# Patient Record
Sex: FEMALE | Race: BLACK OR AFRICAN AMERICAN | NOT HISPANIC OR LATINO | Employment: FULL TIME | URBAN - METROPOLITAN AREA
[De-identification: names, ages, dates, MRNs, and addresses within clinical notes are randomized per-mention and may not be internally consistent; named-entity substitution may affect disease eponyms.]

---

## 2023-04-06 ENCOUNTER — HOSPITAL ENCOUNTER (OUTPATIENT)
Dept: DATA CONVERSION | Facility: HOSPITAL | Age: 58
End: 2023-04-06
Attending: INTERNAL MEDICINE | Admitting: INTERNAL MEDICINE
Payer: COMMERCIAL

## 2023-04-06 DIAGNOSIS — K52.89 OTHER SPECIFIED NONINFECTIVE GASTROENTERITIS AND COLITIS: ICD-10-CM

## 2023-04-06 DIAGNOSIS — F17.290 NICOTINE DEPENDENCE, OTHER TOBACCO PRODUCT, UNCOMPLICATED: ICD-10-CM

## 2023-04-06 DIAGNOSIS — K64.8 OTHER HEMORRHOIDS: ICD-10-CM

## 2023-04-06 DIAGNOSIS — K92.1 MELENA: ICD-10-CM

## 2023-04-06 DIAGNOSIS — F41.9 ANXIETY DISORDER, UNSPECIFIED: ICD-10-CM

## 2023-04-06 DIAGNOSIS — K62.5 HEMORRHAGE OF ANUS AND RECTUM: ICD-10-CM

## 2023-04-17 LAB
COMPLETE PATHOLOGY REPORT: NORMAL
CONVERTED CLINICAL DIAGNOSIS-HISTORY: NORMAL
CONVERTED FINAL DIAGNOSIS: NORMAL
CONVERTED FINAL REPORT PDF LINK TO COPY AND PASTE: NORMAL
CONVERTED GROSS DESCRIPTION: NORMAL

## 2023-11-02 PROBLEM — R91.1 PULMONARY NODULE: Status: ACTIVE | Noted: 2023-11-02

## 2023-11-02 PROBLEM — H04.123 DRY EYE SYNDROME OF BOTH LACRIMAL GLANDS: Status: ACTIVE | Noted: 2023-11-02

## 2023-11-02 PROBLEM — G56.01 CARPAL TUNNEL SYNDROME OF RIGHT WRIST: Status: ACTIVE | Noted: 2023-11-02

## 2023-11-02 PROBLEM — N92.0 MENORRHAGIA: Status: ACTIVE | Noted: 2023-11-02

## 2023-11-02 PROBLEM — R93.5 ABNORMAL CT OF THE ABDOMEN: Status: ACTIVE | Noted: 2023-11-02

## 2023-11-02 PROBLEM — K62.5 RECTAL BLEEDING: Status: ACTIVE | Noted: 2023-11-02

## 2023-11-02 PROBLEM — N64.4 BREAST PAIN: Status: ACTIVE | Noted: 2023-11-02

## 2023-11-02 PROBLEM — J34.3 HYPERTROPHY OF BOTH INFERIOR NASAL TURBINATES: Status: ACTIVE | Noted: 2023-11-02

## 2023-11-02 PROBLEM — R10.11 CHRONIC RUQ PAIN: Status: ACTIVE | Noted: 2023-11-02

## 2023-11-02 PROBLEM — D49.9 NEOPLASM: Status: ACTIVE | Noted: 2020-01-20

## 2023-11-02 PROBLEM — R42 LIGHTHEADED: Status: ACTIVE | Noted: 2023-11-02

## 2023-11-02 PROBLEM — J30.2 ALLERGIC RHINITIS, SEASONAL: Status: ACTIVE | Noted: 2023-11-02

## 2023-11-02 PROBLEM — N83.209 OVARIAN CYST: Status: ACTIVE | Noted: 2023-11-02

## 2023-11-02 PROBLEM — H02.88A MEIBOMIAN GLAND DYSFUNCTION (MGD) OF UPPER AND LOWER EYELID OF RIGHT EYE: Status: ACTIVE | Noted: 2023-11-02

## 2023-11-02 PROBLEM — L98.9 SKIN LESION: Status: ACTIVE | Noted: 2023-11-02

## 2023-11-02 PROBLEM — G56.02 CARPAL TUNNEL SYNDROME OF LEFT WRIST: Status: ACTIVE | Noted: 2023-11-02

## 2023-11-02 PROBLEM — L30.9 DERMATITIS, UNSPECIFIED: Status: ACTIVE | Noted: 2020-01-20

## 2023-11-02 PROBLEM — H52.4 BILATERAL PRESBYOPIA: Status: ACTIVE | Noted: 2023-11-02

## 2023-11-02 PROBLEM — D25.9 UTERINE FIBROID: Status: ACTIVE | Noted: 2023-11-02

## 2023-11-02 PROBLEM — S12.9XXA CERVICAL SPINE FRACTURE (MULTI): Status: ACTIVE | Noted: 2023-11-02

## 2023-11-02 PROBLEM — N63.22 BREAST LUMP ON LEFT SIDE AT 11 O'CLOCK POSITION: Status: ACTIVE | Noted: 2023-11-02

## 2023-11-02 PROBLEM — R53.83 FATIGUE: Status: ACTIVE | Noted: 2023-11-02

## 2023-11-02 PROBLEM — L82.1 SEBORRHEIC KERATOSIS: Status: ACTIVE | Noted: 2020-01-20

## 2023-11-02 PROBLEM — E55.9 VITAMIN D DEFICIENCY: Status: ACTIVE | Noted: 2023-11-02

## 2023-11-02 PROBLEM — G89.29 CHRONIC RUQ PAIN: Status: ACTIVE | Noted: 2023-11-02

## 2023-11-02 PROBLEM — E87.6 HYPOKALEMIA: Status: ACTIVE | Noted: 2023-11-02

## 2023-11-02 PROBLEM — R26.89 IMBALANCE: Status: ACTIVE | Noted: 2023-11-02

## 2023-11-02 PROBLEM — L82.1 OTHER SEBORRHEIC KERATOSIS: Status: ACTIVE | Noted: 2020-01-20

## 2023-11-02 PROBLEM — N93.9 ABNORMAL UTERINE BLEEDING (AUB): Status: ACTIVE | Noted: 2023-11-02

## 2023-11-02 PROBLEM — H02.88B MEIBOMIAN GLAND DYSFUNCTION (MGD) OF UPPER AND LOWER EYELID OF LEFT EYE: Status: ACTIVE | Noted: 2023-11-02

## 2023-11-02 PROBLEM — R19.00 PELVIC MASS IN FEMALE: Status: ACTIVE | Noted: 2023-11-02

## 2023-11-02 PROBLEM — N64.4 BREAST TENDERNESS: Status: ACTIVE | Noted: 2023-11-02

## 2023-11-02 PROBLEM — N85.2 ENLARGED UTERUS: Status: ACTIVE | Noted: 2023-11-02

## 2023-11-02 PROBLEM — R51.9 FREQUENT HEADACHES: Status: ACTIVE | Noted: 2023-11-02

## 2023-11-02 PROBLEM — R92.8 ABNORMAL FINDINGS ON DIAGNOSTIC IMAGING OF BREAST: Status: ACTIVE | Noted: 2023-11-02

## 2023-11-02 PROBLEM — E04.1 THYROID NODULE: Status: ACTIVE | Noted: 2023-11-02

## 2023-11-02 RX ORDER — DOCUSATE SODIUM 100 MG/1
1 CAPSULE, LIQUID FILLED ORAL DAILY
COMMUNITY
Start: 2020-12-17 | End: 2023-11-03 | Stop reason: ALTCHOICE

## 2023-11-02 RX ORDER — FAMOTIDINE 40 MG/1
1 TABLET, FILM COATED ORAL DAILY
COMMUNITY
Start: 2023-02-15 | End: 2023-11-03 | Stop reason: ALTCHOICE

## 2023-11-02 RX ORDER — CLOBETASOL PROPIONATE 0.5 MG/G
OINTMENT TOPICAL
COMMUNITY
Start: 2023-03-20 | End: 2023-11-03 | Stop reason: ALTCHOICE

## 2023-11-02 RX ORDER — ARTIFICIAL TEARS 1; 2; 3 MG/ML; MG/ML; MG/ML
1-2 SOLUTION/ DROPS OPHTHALMIC AS NEEDED
COMMUNITY
Start: 2023-02-17 | End: 2023-11-03 | Stop reason: ALTCHOICE

## 2023-11-02 RX ORDER — FLUOCINONIDE 0.5 MG/G
1 CREAM TOPICAL
COMMUNITY
Start: 2020-01-20 | End: 2023-11-03 | Stop reason: ALTCHOICE

## 2023-11-02 RX ORDER — ACETAMINOPHEN 500 MG
1 TABLET ORAL DAILY
COMMUNITY
Start: 2018-02-09 | End: 2024-01-31 | Stop reason: WASHOUT

## 2023-11-02 RX ORDER — HYDROXYZINE HYDROCHLORIDE 25 MG/1
1 TABLET, FILM COATED ORAL EVERY 6 HOURS PRN
COMMUNITY
Start: 2023-02-19 | End: 2023-11-03 | Stop reason: ALTCHOICE

## 2023-11-02 RX ORDER — ALBUTEROL SULFATE 90 UG/1
2 AEROSOL, METERED RESPIRATORY (INHALATION) 4 TIMES DAILY PRN
COMMUNITY
Start: 2018-07-08 | End: 2023-11-03 | Stop reason: ALTCHOICE

## 2023-11-02 RX ORDER — POLYETHYLENE GLYCOL 3350 17 G/17G
17 POWDER, FOR SOLUTION ORAL DAILY
COMMUNITY
Start: 2020-12-17 | End: 2023-11-03 | Stop reason: ALTCHOICE

## 2023-11-03 ENCOUNTER — OFFICE VISIT (OUTPATIENT)
Dept: OBSTETRICS AND GYNECOLOGY | Facility: CLINIC | Age: 58
End: 2023-11-03
Payer: COMMERCIAL

## 2023-11-03 VITALS
WEIGHT: 129.9 LBS | BODY MASS INDEX: 23.76 KG/M2 | SYSTOLIC BLOOD PRESSURE: 122 MMHG | HEART RATE: 78 BPM | DIASTOLIC BLOOD PRESSURE: 75 MMHG

## 2023-11-03 DIAGNOSIS — Z80.49 FAMILY HISTORY OF UTERINE CANCER: ICD-10-CM

## 2023-11-03 DIAGNOSIS — Z01.419 WOMEN'S ANNUAL ROUTINE GYNECOLOGICAL EXAMINATION: Primary | ICD-10-CM

## 2023-11-03 PROCEDURE — 99396 PREV VISIT EST AGE 40-64: CPT | Performed by: OBSTETRICS & GYNECOLOGY

## 2023-11-03 PROCEDURE — 1036F TOBACCO NON-USER: CPT | Performed by: OBSTETRICS & GYNECOLOGY

## 2023-11-03 ASSESSMENT — PAIN SCALES - GENERAL: PAINLEVEL: 0-NO PAIN

## 2023-11-03 NOTE — PROGRESS NOTES
Luther Ulloa y/o who presents for annual gyn exam.      Preventive health  - no further cervical cancer screening due to history of prior total hyst, no h/o dysplasia  - mammogram due 2024  - c-scope due reportedly in 10 years, had scope this year for bleeding, but also with history of uterine cancer in mom.  Referral to genetics to discuss possibility of Randolph syndrome screening  - lipids, DM screening, TSH, CBC        -------------------------------------  HPI    Here for annual exam  No gyn complaints  Experiencing BRBPR  History updated in chart        Vitals:    11/03/23 1444   BP: 122/75   Pulse: 78       Physical Exam  Constitutional:       Appearance: Normal appearance.   Genitourinary:      No lesions in the vagina.      Right Labia: No rash, lesions, skin changes or Bartholin's cyst.     Left Labia: No lesions, skin changes, Bartholin's cyst or rash.     No vaginal discharge or bleeding.   Breasts:     Right: No bleeding, mass, nipple discharge, skin change or tenderness.      Left: No bleeding, mass, nipple discharge, skin change or tenderness.   HENT:      Head: Normocephalic and atraumatic.   Pulmonary:      Effort: Pulmonary effort is normal.   Abdominal:      General: Abdomen is flat.      Palpations: Abdomen is soft.   Musculoskeletal:      Cervical back: Neck supple.   Lymphadenopathy:      Upper Body:      Right upper body: No supraclavicular or axillary adenopathy.      Left upper body: No supraclavicular or axillary adenopathy.   Neurological:      General: No focal deficit present.      Mental Status: She is alert.   Skin:     General: Skin is warm and dry.   Psychiatric:         Mood and Affect: Mood normal.         Behavior: Behavior normal.         Thought Content: Thought content normal.

## 2024-01-15 ENCOUNTER — HOSPITAL ENCOUNTER (EMERGENCY)
Facility: HOSPITAL | Age: 59
Discharge: HOME | End: 2024-01-16
Attending: STUDENT IN AN ORGANIZED HEALTH CARE EDUCATION/TRAINING PROGRAM
Payer: COMMERCIAL

## 2024-01-15 ENCOUNTER — APPOINTMENT (OUTPATIENT)
Dept: RADIOLOGY | Facility: HOSPITAL | Age: 59
End: 2024-01-15
Payer: COMMERCIAL

## 2024-01-15 ENCOUNTER — APPOINTMENT (OUTPATIENT)
Dept: CARDIOLOGY | Facility: HOSPITAL | Age: 59
End: 2024-01-15
Payer: COMMERCIAL

## 2024-01-15 VITALS
HEART RATE: 66 BPM | DIASTOLIC BLOOD PRESSURE: 60 MMHG | WEIGHT: 128 LBS | TEMPERATURE: 99 F | RESPIRATION RATE: 14 BRPM | SYSTOLIC BLOOD PRESSURE: 129 MMHG | BODY MASS INDEX: 23.41 KG/M2 | OXYGEN SATURATION: 96 %

## 2024-01-15 DIAGNOSIS — R10.84 GENERALIZED ABDOMINAL PAIN: ICD-10-CM

## 2024-01-15 DIAGNOSIS — R11.0 NAUSEA: ICD-10-CM

## 2024-01-15 DIAGNOSIS — R06.02 SHORTNESS OF BREATH: ICD-10-CM

## 2024-01-15 DIAGNOSIS — J10.1 INFLUENZA A: Primary | ICD-10-CM

## 2024-01-15 DIAGNOSIS — R07.9 CHEST PAIN, UNSPECIFIED TYPE: ICD-10-CM

## 2024-01-15 DIAGNOSIS — R91.8 GROUND GLASS OPACITY PRESENT ON IMAGING OF LUNG: ICD-10-CM

## 2024-01-15 LAB
ALBUMIN SERPL BCP-MCNC: 4.2 G/DL (ref 3.4–5)
ALP SERPL-CCNC: 41 U/L (ref 33–110)
ALT SERPL W P-5'-P-CCNC: 16 U/L (ref 7–45)
ANION GAP SERPL CALC-SCNC: 13 MMOL/L (ref 10–20)
AST SERPL W P-5'-P-CCNC: 31 U/L (ref 9–39)
BASOPHILS # BLD AUTO: 0 X10*3/UL (ref 0–0.1)
BASOPHILS NFR BLD AUTO: 0 %
BILIRUB SERPL-MCNC: 0.5 MG/DL (ref 0–1.2)
BUN SERPL-MCNC: 12 MG/DL (ref 6–23)
CALCIUM SERPL-MCNC: 8.8 MG/DL (ref 8.6–10.3)
CARDIAC TROPONIN I PNL SERPL HS: 10 NG/L (ref 0–13)
CHLORIDE SERPL-SCNC: 100 MMOL/L (ref 98–107)
CO2 SERPL-SCNC: 25 MMOL/L (ref 21–32)
CREAT SERPL-MCNC: 0.85 MG/DL (ref 0.5–1.05)
EGFRCR SERPLBLD CKD-EPI 2021: 80 ML/MIN/1.73M*2
EOSINOPHIL # BLD AUTO: 0 X10*3/UL (ref 0–0.7)
EOSINOPHIL NFR BLD AUTO: 0 %
ERYTHROCYTE [DISTWIDTH] IN BLOOD BY AUTOMATED COUNT: 12.9 % (ref 11.5–14.5)
FLUAV RNA RESP QL NAA+PROBE: DETECTED
FLUBV RNA RESP QL NAA+PROBE: NOT DETECTED
GLUCOSE SERPL-MCNC: 103 MG/DL (ref 74–99)
HCT VFR BLD AUTO: 40.5 % (ref 36–46)
HGB BLD-MCNC: 14.2 G/DL (ref 12–16)
IMM GRANULOCYTES # BLD AUTO: 0.01 X10*3/UL (ref 0–0.7)
IMM GRANULOCYTES NFR BLD AUTO: 0.2 % (ref 0–0.9)
LIPASE SERPL-CCNC: 32 U/L (ref 9–82)
LYMPHOCYTES # BLD AUTO: 0.44 X10*3/UL (ref 1.2–4.8)
LYMPHOCYTES NFR BLD AUTO: 10.6 %
MAGNESIUM SERPL-MCNC: 1.77 MG/DL (ref 1.6–2.4)
MCH RBC QN AUTO: 30.9 PG (ref 26–34)
MCHC RBC AUTO-ENTMCNC: 35.1 G/DL (ref 32–36)
MCV RBC AUTO: 88 FL (ref 80–100)
MONOCYTES # BLD AUTO: 0.54 X10*3/UL (ref 0.1–1)
MONOCYTES NFR BLD AUTO: 12.9 %
NEUTROPHILS # BLD AUTO: 3.18 X10*3/UL (ref 1.2–7.7)
NEUTROPHILS NFR BLD AUTO: 76.3 %
NRBC BLD-RTO: 0 /100 WBCS (ref 0–0)
PLATELET # BLD AUTO: 153 X10*3/UL (ref 150–450)
POTASSIUM SERPL-SCNC: 3.2 MMOL/L (ref 3.5–5.3)
PROT SERPL-MCNC: 7.6 G/DL (ref 6.4–8.2)
RBC # BLD AUTO: 4.59 X10*6/UL (ref 4–5.2)
RSV RNA RESP QL NAA+PROBE: NOT DETECTED
SARS-COV-2 RNA RESP QL NAA+PROBE: NOT DETECTED
SODIUM SERPL-SCNC: 135 MMOL/L (ref 136–145)
WBC # BLD AUTO: 4.2 X10*3/UL (ref 4.4–11.3)

## 2024-01-15 PROCEDURE — 2500000004 HC RX 250 GENERAL PHARMACY W/ HCPCS (ALT 636 FOR OP/ED)

## 2024-01-15 PROCEDURE — 99285 EMERGENCY DEPT VISIT HI MDM: CPT | Mod: 25

## 2024-01-15 PROCEDURE — 96375 TX/PRO/DX INJ NEW DRUG ADDON: CPT | Mod: 59

## 2024-01-15 PROCEDURE — 36415 COLL VENOUS BLD VENIPUNCTURE: CPT | Performed by: STUDENT IN AN ORGANIZED HEALTH CARE EDUCATION/TRAINING PROGRAM

## 2024-01-15 PROCEDURE — 96374 THER/PROPH/DIAG INJ IV PUSH: CPT | Mod: 59

## 2024-01-15 PROCEDURE — 2550000001 HC RX 255 CONTRASTS

## 2024-01-15 PROCEDURE — 93005 ELECTROCARDIOGRAM TRACING: CPT

## 2024-01-15 PROCEDURE — 99284 EMERGENCY DEPT VISIT MOD MDM: CPT | Performed by: STUDENT IN AN ORGANIZED HEALTH CARE EDUCATION/TRAINING PROGRAM

## 2024-01-15 PROCEDURE — 74177 CT ABD & PELVIS W/CONTRAST: CPT

## 2024-01-15 PROCEDURE — 74177 CT ABD & PELVIS W/CONTRAST: CPT | Performed by: RADIOLOGY

## 2024-01-15 PROCEDURE — 71046 X-RAY EXAM CHEST 2 VIEWS: CPT

## 2024-01-15 PROCEDURE — 85025 COMPLETE CBC W/AUTO DIFF WBC: CPT | Performed by: STUDENT IN AN ORGANIZED HEALTH CARE EDUCATION/TRAINING PROGRAM

## 2024-01-15 PROCEDURE — 71046 X-RAY EXAM CHEST 2 VIEWS: CPT | Performed by: RADIOLOGY

## 2024-01-15 PROCEDURE — 84484 ASSAY OF TROPONIN QUANT: CPT | Performed by: STUDENT IN AN ORGANIZED HEALTH CARE EDUCATION/TRAINING PROGRAM

## 2024-01-15 PROCEDURE — 83690 ASSAY OF LIPASE: CPT

## 2024-01-15 PROCEDURE — 96361 HYDRATE IV INFUSION ADD-ON: CPT

## 2024-01-15 PROCEDURE — 80053 COMPREHEN METABOLIC PANEL: CPT | Performed by: STUDENT IN AN ORGANIZED HEALTH CARE EDUCATION/TRAINING PROGRAM

## 2024-01-15 PROCEDURE — 83735 ASSAY OF MAGNESIUM: CPT

## 2024-01-15 PROCEDURE — 87637 SARSCOV2&INF A&B&RSV AMP PRB: CPT

## 2024-01-15 RX ORDER — KETOROLAC TROMETHAMINE 30 MG/ML
15 INJECTION, SOLUTION INTRAMUSCULAR; INTRAVENOUS ONCE
Status: COMPLETED | OUTPATIENT
Start: 2024-01-15 | End: 2024-01-15

## 2024-01-15 RX ORDER — ACETAMINOPHEN 325 MG/1
975 TABLET ORAL ONCE
Status: COMPLETED | OUTPATIENT
Start: 2024-01-15 | End: 2024-01-15

## 2024-01-15 RX ORDER — IBUPROFEN 400 MG/1
400 TABLET ORAL EVERY 8 HOURS PRN
Qty: 90 TABLET | Refills: 0 | Status: SHIPPED | OUTPATIENT
Start: 2024-01-15 | End: 2024-01-18 | Stop reason: HOSPADM

## 2024-01-15 RX ORDER — ONDANSETRON HYDROCHLORIDE 2 MG/ML
4 INJECTION, SOLUTION INTRAVENOUS ONCE
Status: COMPLETED | OUTPATIENT
Start: 2024-01-15 | End: 2024-01-15

## 2024-01-15 RX ORDER — KETOROLAC TROMETHAMINE 30 MG/ML
15 INJECTION, SOLUTION INTRAMUSCULAR; INTRAVENOUS ONCE
Status: DISCONTINUED | OUTPATIENT
Start: 2024-01-15 | End: 2024-01-15

## 2024-01-15 RX ADMIN — KETOROLAC TROMETHAMINE 15 MG: 30 INJECTION, SOLUTION INTRAMUSCULAR; INTRAVENOUS at 21:57

## 2024-01-15 RX ADMIN — IOHEXOL 75 ML: 350 INJECTION, SOLUTION INTRAVENOUS at 21:01

## 2024-01-15 RX ADMIN — ONDANSETRON 4 MG: 2 INJECTION INTRAMUSCULAR; INTRAVENOUS at 21:27

## 2024-01-15 RX ADMIN — ACETAMINOPHEN 975 MG: 325 TABLET ORAL at 21:54

## 2024-01-15 RX ADMIN — SODIUM CHLORIDE 1000 ML: 9 INJECTION, SOLUTION INTRAVENOUS at 21:25

## 2024-01-15 ASSESSMENT — COLUMBIA-SUICIDE SEVERITY RATING SCALE - C-SSRS
6. HAVE YOU EVER DONE ANYTHING, STARTED TO DO ANYTHING, OR PREPARED TO DO ANYTHING TO END YOUR LIFE?: NO
2. HAVE YOU ACTUALLY HAD ANY THOUGHTS OF KILLING YOURSELF?: NO
1. IN THE PAST MONTH, HAVE YOU WISHED YOU WERE DEAD OR WISHED YOU COULD GO TO SLEEP AND NOT WAKE UP?: NO

## 2024-01-15 ASSESSMENT — LIFESTYLE VARIABLES
EVER FELT BAD OR GUILTY ABOUT YOUR DRINKING: NO
HAVE YOU EVER FELT YOU SHOULD CUT DOWN ON YOUR DRINKING: NO
EVER HAD A DRINK FIRST THING IN THE MORNING TO STEADY YOUR NERVES TO GET RID OF A HANGOVER: NO
REASON UNABLE TO ASSESS: YES
HAVE PEOPLE ANNOYED YOU BY CRITICIZING YOUR DRINKING: NO

## 2024-01-15 ASSESSMENT — PAIN DESCRIPTION - DESCRIPTORS: DESCRIPTORS: SHARP

## 2024-01-15 ASSESSMENT — PAIN SCALES - GENERAL
PAINLEVEL_OUTOF10: 6

## 2024-01-15 ASSESSMENT — HEART SCORE
RISK FACTORS: NO KNOWN RISK FACTORS
AGE: 45-64
HEART SCORE: 2
HISTORY: SLIGHTLY SUSPICIOUS
TROPONIN: LESS THAN OR EQUAL TO NORMAL LIMIT
ECG: NON-SPECIFIC REPOLARIZATION DISTURBANCE

## 2024-01-15 NOTE — Clinical Note
Luther Ulloa was seen and treated in our emergency department on 1/15/2024.  She may return to work on 01/18/2024.       If you have any questions or concerns, please don't hesitate to call.      Elizabeth Weeks PA-C

## 2024-01-16 ENCOUNTER — HOSPITAL ENCOUNTER (OUTPATIENT)
Facility: HOSPITAL | Age: 59
Setting detail: OBSERVATION
Discharge: HOME | End: 2024-01-18
Attending: STUDENT IN AN ORGANIZED HEALTH CARE EDUCATION/TRAINING PROGRAM | Admitting: INTERNAL MEDICINE
Payer: COMMERCIAL

## 2024-01-16 ENCOUNTER — APPOINTMENT (OUTPATIENT)
Dept: CARDIOLOGY | Facility: HOSPITAL | Age: 59
End: 2024-01-16
Payer: COMMERCIAL

## 2024-01-16 DIAGNOSIS — J10.1 INFLUENZA A: ICD-10-CM

## 2024-01-16 DIAGNOSIS — R11.2 NAUSEA AND VOMITING, UNSPECIFIED VOMITING TYPE: Primary | ICD-10-CM

## 2024-01-16 LAB
ALBUMIN SERPL BCP-MCNC: 4.3 G/DL (ref 3.4–5)
ALP SERPL-CCNC: 46 U/L (ref 33–110)
ALT SERPL W P-5'-P-CCNC: 18 U/L (ref 7–45)
ANION GAP SERPL CALC-SCNC: 13 MMOL/L (ref 10–20)
APTT PPP: 41 SECONDS (ref 27–38)
AST SERPL W P-5'-P-CCNC: 38 U/L (ref 9–39)
ATRIAL RATE: 74 BPM
BASOPHILS # BLD AUTO: 0 X10*3/UL (ref 0–0.1)
BASOPHILS NFR BLD AUTO: 0 %
BILIRUB SERPL-MCNC: 0.5 MG/DL (ref 0–1.2)
BUN SERPL-MCNC: 11 MG/DL (ref 6–23)
CALCIUM SERPL-MCNC: 8.9 MG/DL (ref 8.6–10.3)
CHLORIDE SERPL-SCNC: 101 MMOL/L (ref 98–107)
CO2 SERPL-SCNC: 27 MMOL/L (ref 21–32)
CREAT SERPL-MCNC: 0.87 MG/DL (ref 0.5–1.05)
EGFRCR SERPLBLD CKD-EPI 2021: 78 ML/MIN/1.73M*2
EOSINOPHIL # BLD AUTO: 0 X10*3/UL (ref 0–0.7)
EOSINOPHIL NFR BLD AUTO: 0 %
ERYTHROCYTE [DISTWIDTH] IN BLOOD BY AUTOMATED COUNT: 12.9 % (ref 11.5–14.5)
GLUCOSE SERPL-MCNC: 83 MG/DL (ref 74–99)
HCT VFR BLD AUTO: 45 % (ref 36–46)
HGB BLD-MCNC: 15.3 G/DL (ref 12–16)
IMM GRANULOCYTES # BLD AUTO: 0.01 X10*3/UL (ref 0–0.7)
IMM GRANULOCYTES NFR BLD AUTO: 0.3 % (ref 0–0.9)
INR PPP: 1.2 (ref 0.9–1.1)
LACTATE SERPL-SCNC: 0.9 MMOL/L (ref 0.4–2)
LIPASE SERPL-CCNC: 39 U/L (ref 9–82)
LYMPHOCYTES # BLD AUTO: 0.92 X10*3/UL (ref 1.2–4.8)
LYMPHOCYTES NFR BLD AUTO: 25.8 %
MAGNESIUM SERPL-MCNC: 1.85 MG/DL (ref 1.6–2.4)
MCH RBC QN AUTO: 30.2 PG (ref 26–34)
MCHC RBC AUTO-ENTMCNC: 34 G/DL (ref 32–36)
MCV RBC AUTO: 89 FL (ref 80–100)
MONOCYTES # BLD AUTO: 0.34 X10*3/UL (ref 0.1–1)
MONOCYTES NFR BLD AUTO: 9.5 %
NEUTROPHILS # BLD AUTO: 2.3 X10*3/UL (ref 1.2–7.7)
NEUTROPHILS NFR BLD AUTO: 64.4 %
NRBC BLD-RTO: 0 /100 WBCS (ref 0–0)
P AXIS: 9 DEGREES
PLATELET # BLD AUTO: 161 X10*3/UL (ref 150–450)
POTASSIUM SERPL-SCNC: 3.3 MMOL/L (ref 3.5–5.3)
POTASSIUM SERPL-SCNC: 3.8 MMOL/L (ref 3.5–5.3)
PR INTERVAL: 133 MS
PROT SERPL-MCNC: 7.7 G/DL (ref 6.4–8.2)
PROTHROMBIN TIME: 13.2 SECONDS (ref 9.8–12.8)
Q ONSET: 249 MS
QRS COUNT: 12 BEATS
QRS DURATION: 90 MS
QT INTERVAL: 396 MS
QTC CALCULATION(BAZETT): 443 MS
QTC FREDERICIA: 426 MS
R AXIS: 61 DEGREES
RBC # BLD AUTO: 5.07 X10*6/UL (ref 4–5.2)
SODIUM SERPL-SCNC: 138 MMOL/L (ref 136–145)
T AXIS: 64 DEGREES
T OFFSET: 447 MS
VENTRICULAR RATE: 75 BPM
WBC # BLD AUTO: 3.6 X10*3/UL (ref 4.4–11.3)

## 2024-01-16 PROCEDURE — 2500000004 HC RX 250 GENERAL PHARMACY W/ HCPCS (ALT 636 FOR OP/ED): Performed by: STUDENT IN AN ORGANIZED HEALTH CARE EDUCATION/TRAINING PROGRAM

## 2024-01-16 PROCEDURE — 85025 COMPLETE CBC W/AUTO DIFF WBC: CPT | Performed by: PHYSICIAN ASSISTANT

## 2024-01-16 PROCEDURE — 85730 THROMBOPLASTIN TIME PARTIAL: CPT | Performed by: PHYSICIAN ASSISTANT

## 2024-01-16 PROCEDURE — 2500000004 HC RX 250 GENERAL PHARMACY W/ HCPCS (ALT 636 FOR OP/ED): Performed by: NURSE PRACTITIONER

## 2024-01-16 PROCEDURE — 99222 1ST HOSP IP/OBS MODERATE 55: CPT | Performed by: NURSE PRACTITIONER

## 2024-01-16 PROCEDURE — 96361 HYDRATE IV INFUSION ADD-ON: CPT | Performed by: INTERNAL MEDICINE

## 2024-01-16 PROCEDURE — G0378 HOSPITAL OBSERVATION PER HR: HCPCS

## 2024-01-16 PROCEDURE — 96375 TX/PRO/DX INJ NEW DRUG ADDON: CPT | Performed by: INTERNAL MEDICINE

## 2024-01-16 PROCEDURE — 2500000001 HC RX 250 WO HCPCS SELF ADMINISTERED DRUGS (ALT 637 FOR MEDICARE OP): Performed by: PHYSICIAN ASSISTANT

## 2024-01-16 PROCEDURE — 96366 THER/PROPH/DIAG IV INF ADDON: CPT | Performed by: INTERNAL MEDICINE

## 2024-01-16 PROCEDURE — 36415 COLL VENOUS BLD VENIPUNCTURE: CPT | Performed by: PHYSICIAN ASSISTANT

## 2024-01-16 PROCEDURE — 85610 PROTHROMBIN TIME: CPT | Performed by: PHYSICIAN ASSISTANT

## 2024-01-16 PROCEDURE — 99285 EMERGENCY DEPT VISIT HI MDM: CPT | Performed by: STUDENT IN AN ORGANIZED HEALTH CARE EDUCATION/TRAINING PROGRAM

## 2024-01-16 PROCEDURE — C9113 INJ PANTOPRAZOLE SODIUM, VIA: HCPCS | Performed by: STUDENT IN AN ORGANIZED HEALTH CARE EDUCATION/TRAINING PROGRAM

## 2024-01-16 PROCEDURE — 83735 ASSAY OF MAGNESIUM: CPT | Performed by: PHYSICIAN ASSISTANT

## 2024-01-16 PROCEDURE — 96365 THER/PROPH/DIAG IV INF INIT: CPT | Performed by: INTERNAL MEDICINE

## 2024-01-16 PROCEDURE — 84132 ASSAY OF SERUM POTASSIUM: CPT | Mod: 59 | Performed by: NURSE PRACTITIONER

## 2024-01-16 PROCEDURE — 83605 ASSAY OF LACTIC ACID: CPT | Performed by: PHYSICIAN ASSISTANT

## 2024-01-16 PROCEDURE — 36415 COLL VENOUS BLD VENIPUNCTURE: CPT | Performed by: NURSE PRACTITIONER

## 2024-01-16 PROCEDURE — 93005 ELECTROCARDIOGRAM TRACING: CPT

## 2024-01-16 PROCEDURE — 80053 COMPREHEN METABOLIC PANEL: CPT | Performed by: PHYSICIAN ASSISTANT

## 2024-01-16 PROCEDURE — 83690 ASSAY OF LIPASE: CPT | Performed by: PHYSICIAN ASSISTANT

## 2024-01-16 RX ORDER — ACETAMINOPHEN 325 MG/1
650 TABLET ORAL EVERY 4 HOURS PRN
Status: DISCONTINUED | OUTPATIENT
Start: 2024-01-16 | End: 2024-01-18 | Stop reason: HOSPADM

## 2024-01-16 RX ORDER — PANTOPRAZOLE SODIUM 20 MG/1
20 TABLET, DELAYED RELEASE ORAL
Qty: 30 TABLET | Refills: 0 | Status: SHIPPED | OUTPATIENT
Start: 2024-01-16 | End: 2025-01-15

## 2024-01-16 RX ORDER — MULTIVIT-MIN/IRON FUM/FOLIC AC 7.5 MG-4
1 TABLET ORAL DAILY
COMMUNITY

## 2024-01-16 RX ORDER — ONDANSETRON 4 MG/1
4 TABLET, ORALLY DISINTEGRATING ORAL EVERY 8 HOURS PRN
Qty: 30 TABLET | Refills: 0 | Status: SHIPPED | OUTPATIENT
Start: 2024-01-16

## 2024-01-16 RX ORDER — SODIUM CHLORIDE 9 MG/ML
100 INJECTION, SOLUTION INTRAVENOUS CONTINUOUS
Status: DISCONTINUED | OUTPATIENT
Start: 2024-01-16 | End: 2024-01-18 | Stop reason: HOSPADM

## 2024-01-16 RX ORDER — DOCUSATE SODIUM 100 MG/1
100 CAPSULE, LIQUID FILLED ORAL 2 TIMES DAILY
Status: DISCONTINUED | OUTPATIENT
Start: 2024-01-16 | End: 2024-01-18 | Stop reason: HOSPADM

## 2024-01-16 RX ORDER — ACETAMINOPHEN 500 MG
5 TABLET ORAL NIGHTLY PRN
Status: DISCONTINUED | OUTPATIENT
Start: 2024-01-16 | End: 2024-01-18 | Stop reason: HOSPADM

## 2024-01-16 RX ORDER — POTASSIUM CHLORIDE 14.9 MG/ML
20 INJECTION INTRAVENOUS
Status: COMPLETED | OUTPATIENT
Start: 2024-01-16 | End: 2024-01-16

## 2024-01-16 RX ORDER — OSELTAMIVIR PHOSPHATE 75 MG/1
75 CAPSULE ORAL 2 TIMES DAILY
Status: DISCONTINUED | OUTPATIENT
Start: 2024-01-16 | End: 2024-01-18 | Stop reason: HOSPADM

## 2024-01-16 RX ORDER — ONDANSETRON 4 MG/1
4 TABLET, ORALLY DISINTEGRATING ORAL EVERY 8 HOURS PRN
Status: DISCONTINUED | OUTPATIENT
Start: 2024-01-16 | End: 2024-01-18 | Stop reason: HOSPADM

## 2024-01-16 RX ORDER — METOCLOPRAMIDE 10 MG/1
10 TABLET ORAL EVERY 8 HOURS PRN
Qty: 30 TABLET | Refills: 0 | Status: SHIPPED | OUTPATIENT
Start: 2024-01-16

## 2024-01-16 RX ORDER — PANTOPRAZOLE SODIUM 40 MG/10ML
40 INJECTION, POWDER, LYOPHILIZED, FOR SOLUTION INTRAVENOUS
Status: DISCONTINUED | OUTPATIENT
Start: 2024-01-17 | End: 2024-01-18 | Stop reason: HOSPADM

## 2024-01-16 RX ORDER — ONDANSETRON HYDROCHLORIDE 2 MG/ML
4 INJECTION, SOLUTION INTRAVENOUS EVERY 8 HOURS PRN
Status: DISCONTINUED | OUTPATIENT
Start: 2024-01-16 | End: 2024-01-18 | Stop reason: HOSPADM

## 2024-01-16 RX ORDER — ONDANSETRON HYDROCHLORIDE 2 MG/ML
4 INJECTION, SOLUTION INTRAVENOUS ONCE
Status: COMPLETED | OUTPATIENT
Start: 2024-01-16 | End: 2024-01-16

## 2024-01-16 RX ORDER — METOCLOPRAMIDE 10 MG/1
10 TABLET ORAL EVERY 6 HOURS PRN
Status: DISCONTINUED | OUTPATIENT
Start: 2024-01-16 | End: 2024-01-18 | Stop reason: HOSPADM

## 2024-01-16 RX ORDER — FAMOTIDINE 20 MG/1
20 TABLET, FILM COATED ORAL DAILY
Qty: 30 TABLET | Refills: 0 | Status: SHIPPED | OUTPATIENT
Start: 2024-01-16 | End: 2024-02-15

## 2024-01-16 RX ORDER — MULTIVIT-MIN/IRON FUM/FOLIC AC 7.5 MG-4
1 TABLET ORAL DAILY
Status: DISCONTINUED | OUTPATIENT
Start: 2024-01-16 | End: 2024-01-18 | Stop reason: HOSPADM

## 2024-01-16 RX ORDER — PANTOPRAZOLE SODIUM 40 MG/10ML
80 INJECTION, POWDER, LYOPHILIZED, FOR SOLUTION INTRAVENOUS ONCE
Status: COMPLETED | OUTPATIENT
Start: 2024-01-16 | End: 2024-01-16

## 2024-01-16 RX ORDER — PANTOPRAZOLE SODIUM 40 MG/1
40 TABLET, DELAYED RELEASE ORAL
Status: DISCONTINUED | OUTPATIENT
Start: 2024-01-17 | End: 2024-01-18 | Stop reason: HOSPADM

## 2024-01-16 RX ORDER — CHOLECALCIFEROL (VITAMIN D3) 25 MCG
2000 TABLET ORAL DAILY
Status: DISCONTINUED | OUTPATIENT
Start: 2024-01-16 | End: 2024-01-18 | Stop reason: HOSPADM

## 2024-01-16 RX ORDER — METOCLOPRAMIDE HYDROCHLORIDE 5 MG/ML
10 INJECTION INTRAMUSCULAR; INTRAVENOUS EVERY 6 HOURS PRN
Status: DISCONTINUED | OUTPATIENT
Start: 2024-01-16 | End: 2024-01-18 | Stop reason: HOSPADM

## 2024-01-16 RX ORDER — ACETAMINOPHEN 160 MG/5ML
650 SOLUTION ORAL EVERY 4 HOURS PRN
Status: DISCONTINUED | OUTPATIENT
Start: 2024-01-16 | End: 2024-01-18 | Stop reason: HOSPADM

## 2024-01-16 RX ORDER — ACETAMINOPHEN 325 MG/1
650 TABLET ORAL ONCE
Status: COMPLETED | OUTPATIENT
Start: 2024-01-16 | End: 2024-01-16

## 2024-01-16 RX ORDER — ACETAMINOPHEN 650 MG/1
650 SUPPOSITORY RECTAL EVERY 4 HOURS PRN
Status: DISCONTINUED | OUTPATIENT
Start: 2024-01-16 | End: 2024-01-18 | Stop reason: HOSPADM

## 2024-01-16 RX ORDER — BENZONATATE 100 MG/1
100 CAPSULE ORAL 3 TIMES DAILY PRN
Status: DISCONTINUED | OUTPATIENT
Start: 2024-01-16 | End: 2024-01-18 | Stop reason: HOSPADM

## 2024-01-16 RX ORDER — MORPHINE SULFATE 4 MG/ML
4 INJECTION, SOLUTION INTRAMUSCULAR; INTRAVENOUS ONCE
Status: DISCONTINUED | OUTPATIENT
Start: 2024-01-16 | End: 2024-01-18 | Stop reason: HOSPADM

## 2024-01-16 RX ADMIN — OSELTAMIVIR 75 MG: 75 CAPSULE ORAL at 20:04

## 2024-01-16 RX ADMIN — ACETAMINOPHEN 650 MG: 325 TABLET ORAL at 17:46

## 2024-01-16 RX ADMIN — SODIUM CHLORIDE, POTASSIUM CHLORIDE, SODIUM LACTATE AND CALCIUM CHLORIDE 1000 ML: 600; 310; 30; 20 INJECTION, SOLUTION INTRAVENOUS at 15:40

## 2024-01-16 RX ADMIN — PANTOPRAZOLE SODIUM 80 MG: 40 INJECTION, POWDER, FOR SOLUTION INTRAVENOUS at 16:12

## 2024-01-16 RX ADMIN — POTASSIUM CHLORIDE 20 MEQ: 14.9 INJECTION, SOLUTION INTRAVENOUS at 19:59

## 2024-01-16 RX ADMIN — Medication 5 MG: at 22:50

## 2024-01-16 RX ADMIN — SODIUM CHLORIDE 100 ML/HR: 9 INJECTION, SOLUTION INTRAVENOUS at 20:09

## 2024-01-16 RX ADMIN — POTASSIUM CHLORIDE 20 MEQ: 14.9 INJECTION, SOLUTION INTRAVENOUS at 17:01

## 2024-01-16 RX ADMIN — ONDANSETRON 4 MG: 2 INJECTION INTRAMUSCULAR; INTRAVENOUS at 16:11

## 2024-01-16 SDOH — SOCIAL STABILITY: SOCIAL INSECURITY: ARE THERE ANY APPARENT SIGNS OF INJURIES/BEHAVIORS THAT COULD BE RELATED TO ABUSE/NEGLECT?: NO

## 2024-01-16 SDOH — SOCIAL STABILITY: SOCIAL INSECURITY: ARE YOU OR HAVE YOU BEEN THREATENED OR ABUSED PHYSICALLY, EMOTIONALLY, OR SEXUALLY BY ANYONE?: NO

## 2024-01-16 SDOH — SOCIAL STABILITY: SOCIAL INSECURITY: WERE YOU ABLE TO COMPLETE ALL THE BEHAVIORAL HEALTH SCREENINGS?: YES

## 2024-01-16 SDOH — SOCIAL STABILITY: SOCIAL INSECURITY: DOES ANYONE TRY TO KEEP YOU FROM HAVING/CONTACTING OTHER FRIENDS OR DOING THINGS OUTSIDE YOUR HOME?: NO

## 2024-01-16 SDOH — SOCIAL STABILITY: SOCIAL INSECURITY: ABUSE: ADULT

## 2024-01-16 SDOH — SOCIAL STABILITY: SOCIAL INSECURITY: DO YOU FEEL UNSAFE GOING BACK TO THE PLACE WHERE YOU ARE LIVING?: NO

## 2024-01-16 SDOH — SOCIAL STABILITY: SOCIAL INSECURITY: HAS ANYONE EVER THREATENED TO HURT YOUR FAMILY OR YOUR PETS?: NO

## 2024-01-16 SDOH — SOCIAL STABILITY: SOCIAL INSECURITY: DO YOU FEEL ANYONE HAS EXPLOITED OR TAKEN ADVANTAGE OF YOU FINANCIALLY OR OF YOUR PERSONAL PROPERTY?: NO

## 2024-01-16 SDOH — SOCIAL STABILITY: SOCIAL INSECURITY: HAVE YOU HAD THOUGHTS OF HARMING ANYONE ELSE?: NO

## 2024-01-16 ASSESSMENT — PATIENT HEALTH QUESTIONNAIRE - PHQ9
2. FEELING DOWN, DEPRESSED OR HOPELESS: NOT AT ALL
1. LITTLE INTEREST OR PLEASURE IN DOING THINGS: NOT AT ALL
SUM OF ALL RESPONSES TO PHQ9 QUESTIONS 1 & 2: 0

## 2024-01-16 ASSESSMENT — ENCOUNTER SYMPTOMS
VOMITING: 1
EYES NEGATIVE: 1
SHORTNESS OF BREATH: 0
PSYCHIATRIC NEGATIVE: 1
APPETITE CHANGE: 1
ALLERGIC/IMMUNOLOGIC NEGATIVE: 1
NAUSEA: 1
ABDOMINAL PAIN: 1
MUSCULOSKELETAL NEGATIVE: 1
FEVER: 1
ENDOCRINE NEGATIVE: 1
CARDIOVASCULAR NEGATIVE: 1
HEMATOLOGIC/LYMPHATIC NEGATIVE: 1
COUGH: 1
NEUROLOGICAL NEGATIVE: 1

## 2024-01-16 ASSESSMENT — COGNITIVE AND FUNCTIONAL STATUS - GENERAL
PATIENT BASELINE BEDBOUND: NO
MOBILITY SCORE: 24
DAILY ACTIVITIY SCORE: 24

## 2024-01-16 ASSESSMENT — LIFESTYLE VARIABLES
HAVE PEOPLE ANNOYED YOU BY CRITICIZING YOUR DRINKING: NO
HAVE YOU EVER FELT YOU SHOULD CUT DOWN ON YOUR DRINKING: NO
HOW MANY STANDARD DRINKS CONTAINING ALCOHOL DO YOU HAVE ON A TYPICAL DAY: PATIENT DOES NOT DRINK
EVER HAD A DRINK FIRST THING IN THE MORNING TO STEADY YOUR NERVES TO GET RID OF A HANGOVER: NO
HOW OFTEN DO YOU HAVE 6 OR MORE DRINKS ON ONE OCCASION: NEVER
AUDIT-C TOTAL SCORE: 0
HOW OFTEN DO YOU HAVE A DRINK CONTAINING ALCOHOL: NEVER
SKIP TO QUESTIONS 9-10: 1
EVER FELT BAD OR GUILTY ABOUT YOUR DRINKING: NO
AUDIT-C TOTAL SCORE: 0

## 2024-01-16 ASSESSMENT — COLUMBIA-SUICIDE SEVERITY RATING SCALE - C-SSRS
2. HAVE YOU ACTUALLY HAD ANY THOUGHTS OF KILLING YOURSELF?: NO
6. HAVE YOU EVER DONE ANYTHING, STARTED TO DO ANYTHING, OR PREPARED TO DO ANYTHING TO END YOUR LIFE?: NO
2. HAVE YOU ACTUALLY HAD ANY THOUGHTS OF KILLING YOURSELF?: NO
6. HAVE YOU EVER DONE ANYTHING, STARTED TO DO ANYTHING, OR PREPARED TO DO ANYTHING TO END YOUR LIFE?: NO
1. SINCE LAST CONTACT, HAVE YOU WISHED YOU WERE DEAD OR WISHED YOU COULD GO TO SLEEP AND NOT WAKE UP?: NO
1. IN THE PAST MONTH, HAVE YOU WISHED YOU WERE DEAD OR WISHED YOU COULD GO TO SLEEP AND NOT WAKE UP?: NO

## 2024-01-16 ASSESSMENT — PAIN SCALES - GENERAL
PAINLEVEL_OUTOF10: 5 - MODERATE PAIN
PAINLEVEL_OUTOF10: 5 - MODERATE PAIN
PAINLEVEL_OUTOF10: 0 - NO PAIN
PAINLEVEL_OUTOF10: 7

## 2024-01-16 ASSESSMENT — PAIN - FUNCTIONAL ASSESSMENT
PAIN_FUNCTIONAL_ASSESSMENT: 0-10
PAIN_FUNCTIONAL_ASSESSMENT: 0-10

## 2024-01-16 ASSESSMENT — ACTIVITIES OF DAILY LIVING (ADL)
HEARING - RIGHT EAR: FUNCTIONAL
ADEQUATE_TO_COMPLETE_ADL: YES
TOILETING: INDEPENDENT
JUDGMENT_ADEQUATE_SAFELY_COMPLETE_DAILY_ACTIVITIES: YES
FEEDING YOURSELF: INDEPENDENT
BATHING: INDEPENDENT
DRESSING YOURSELF: INDEPENDENT
HEARING - LEFT EAR: FUNCTIONAL
WALKS IN HOME: INDEPENDENT
PATIENT'S MEMORY ADEQUATE TO SAFELY COMPLETE DAILY ACTIVITIES?: YES
LACK_OF_TRANSPORTATION: NO
GROOMING: INDEPENDENT

## 2024-01-16 ASSESSMENT — PAIN DESCRIPTION - PAIN TYPE: TYPE: ACUTE PAIN

## 2024-01-16 ASSESSMENT — PAIN DESCRIPTION - LOCATION: LOCATION: ABDOMEN

## 2024-01-16 ASSESSMENT — PAIN DESCRIPTION - DESCRIPTORS: DESCRIPTORS: ACHING

## 2024-01-16 NOTE — ED PROVIDER NOTES
HPI   Chief Complaint   Patient presents with   • Chest Pain     Pt arrives private auto from home. States sudden onset chest pain starting this afternoon. Stated vomited x 2. States left arm pain. Denies cardiac hx. States back pain and body aches all over.        Luther is a 58-year-old female with no significant past medical history presenting to the Emergency Department with chest pain and flu-like symptoms.  Patient states that on Saturday, 1/13/2024, she started to experience a dry cough and nasal congestion.  On Sunday, 1/14/2024, her cough worsened and she started to develop midsternal chest pain that radiates into her left shoulder.  She does mention that her shoulder pain may be related to work.  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  Patient denies any history of ACS or lung disease.  No family history of sudden cardiac death.  Patient is also having chills, nausea, and nonbloody nonbilious emesis.  Not been able to eat or drink much over the past couple days due to the symptoms.  Admits to diffuse abdominal pain.  No history of intra-abdominal procedures.  Denies hematuria, dysuria, pyuria, lightheadedness, dizziness, headache.                           No data recorded                Patient History   Past Medical History:   Diagnosis Date   • Benign neoplasm of unspecified breast     Breast fibroadenoma   • Endometriosis     found at time of hyst; involved small bowel   • Personal history of other specified conditions     History of fibrocystic disease of breast     Past Surgical History:   Procedure Laterality Date   • BREAST SURGERY  06/25/2015    Breast Surgery   • HYSTERECTOMY  03/23/2017    total Hysterectomy   • INCISIONAL BREAST BIOPSY  06/25/2015    Incisional Breast Biopsy   • OTHER SURGICAL HISTORY  06/08/2015    Surgical Excision Of Ectopic Pregnancy Unspecified Location   • OTHER SURGICAL HISTORY  12/17/2020    Complete colonoscopy   • SALPINGOOPHORECTOMY Left  2015    @ time of hysterectomy   • SMALL INTESTINE SURGERY  2015    at time of hyst, found endometriosis   • US GUIDED THYROID BIOPSY  01/30/2020    US GUIDED THYROID BIOPSY 1/30/2020 [facility]     Family History   Problem Relation Name Age of Onset   • Lung cancer Mother     • Uterine cancer Mother     • Brain cancer Mother     • Amblyopia Father     • Diabetes Father     • Multiple myeloma Sister       Social History     Tobacco Use   • Smoking status: Never   • Smokeless tobacco: Never   Substance Use Topics   • Alcohol use: Yes     Comment: different drinks per week   • Drug use: Never       Physical Exam   ED Triage Vitals [01/15/24 1818]   Temp Heart Rate Resp BP   36.7 °C (98.1 °F) 66 18 (!) 172/132      SpO2 Temp src Heart Rate Source Patient Position   99 % -- -- --      BP Location FiO2 (%)     -- --       Physical Exam  Constitutional:       Appearance: Normal appearance.   HENT:      Mouth/Throat:      Mouth: Mucous membranes are dry.      Pharynx: Oropharynx is clear. No oropharyngeal exudate or posterior oropharyngeal erythema.   Eyes:      Extraocular Movements: Extraocular movements intact.   Cardiovascular:      Rate and Rhythm: Normal rate and regular rhythm.      Pulses: Normal pulses.      Heart sounds: Normal heart sounds.   Pulmonary:      Effort: Pulmonary effort is normal. No respiratory distress.      Breath sounds: Normal breath sounds. No stridor. No wheezing, rhonchi or rales.   Abdominal:      General: Abdomen is flat. There is no distension.      Palpations: Abdomen is soft.      Tenderness: There is abdominal tenderness. There is no guarding or rebound.      Comments: Diffuse tenderness to palpation.   Musculoskeletal:      Cervical back: Normal range of motion.   Skin:     General: Skin is warm and dry.   Neurological:      General: No focal deficit present.      Mental Status: She is alert.   Psychiatric:         Mood and Affect: Mood normal.         Behavior: Behavior normal.          ED Course & MDM   ED Course as of 01/15/24 2341   Mon Woody 15, 2024   2055 SODIUM(!): 135 []   2055 POTASSIUM(!): 3.2 []   2104 MAGNESIUM: 1.77 []   2116 LIPASE: 32 []   2310 Flu A Result(!): Detected []      ED Course User Index  [] Elizabeth Weeks, PA-C         Diagnoses as of 01/15/24 2341   Influenza A   Generalized abdominal pain   Chest pain, unspecified type   Nausea   Shortness of breath   Ground glass opacity present on imaging of lung       Medical Decision Making  Luther is a 59 y/o female presenting to the emergency department with chest pain, shortness of breath, and flulike symptoms.  Patient does not have any significant past medical history and there is no family history of sudden cardiac death.  She has been having multiple episodes of emesis with nausea and diffuse abdominal pain.  Also admits to chills.  No urinary symptoms.    Differentials include appendicitis, pancreatitis, diverticulitis, nephrolithiasis, acute cystitis, pyelonephritis, pregnancy, small bowel obstruction, influenza, COVID, RSV, other viral illness, ACS, pneumonia, cardiomyopathy, malignancy, sepsis.  Workup consisted of CBC, CMP, magnesium, lipase, troponin, PCR swabs for RSV/COVID/influenza, EKG, chest x-ray, CT abdomen pelvis with IV contrast.  CBC did not demonstrate elevation in white count.  CMP with hyponatremia of 135 and hypokalemia of 3.2.  Magnesium, lipase, and troponin unremarkable.  No signs of infection on urinalysis.  PCR swab POSITIVE for Influenza A. EKG normal sinus rate and rhythm.  No STEMI.  QTc 443.  Chest x-ray mild perihilar and interstitial prominence with scattered hazy opacities which may represent mild edema or atypical infectious process.  CT abdomen pelvis no acute inflammatory process throughout the abdomen or pelvis. Incompletely visualized ground-glass opacities in the right lower lobe, which may be infectious or inflammatory in etiology.      Patient was initially treated  with intravenous fluids, Zofran, and Toradol. Developed a fever while in the emergency room, so she was treated with Tylenol.  Discussed all results with patient.  Her vitals are stable.  Heart score two demonstrating low risk for cardiovascular event.  Patient will be discharged home and recommended supportive care for her influenza A including adequate rest, hydration, and alternating Tylenol/ibuprofen for fever control.  She should quarantine for 5 days from symptom onset and may return to her normal activities after as long as she is fever free.  Reasons to return to emergency room include chest pain or shortness of breath.  Patient was agreeable to plan.  Addressed all question concerns.        Procedure  Procedures     Elizabeth Weeks PA-C  01/15/24 8044       Elizabeth Weeks PA-C  01/15/24 7983

## 2024-01-16 NOTE — ED TRIAGE NOTES
"As provider-in-triage, I performed a medical screening history and physical exam on this patient.    HISTORY OF PRESENT ILLNESS:  58-year-old female presents today with chief complaint of nausea and vomiting.  Patient ports he was seen here yesterday for similar symptoms where she was diagnosed with flu and sent home with medications to treat her symptoms.  She states that today she has continued had vomiting and noticed some specks of blood in her vomit so she came in to be reevaluated.    Vital Signs reviewed:  Heart Rate:  [63-70]   Temp:  [36.7 °C (98.1 °F)-38.6 °C (101.5 °F)]   Resp:  [14-19]   BP: (113-172)/()   Height:  [157.5 cm (5' 2\")]   Weight:  [58.1 kg (128 lb)]   SpO2:  [96 %-100 %]     BRIEF PHYSICAL EXAM:  Cardiac regular rate rhythm no murmur. Lungs clear bilaterally. Abdomen with epigastric tenderness.     MDM:  Repeat blood work, EKG    I evaluated this patient in triage with the RN. Due to the patients complaint labs and or imaging were ordered by myself in an attempt to expedite patient care however I am not participating in care after evaluation. This is a preliminary assessment. Pt does not appear in acute distress at this time. They are stable and will have a full evaluation as soon as possible. They will be cared for by another provider who will possibly order more labs, imaging or interventions. Pt did not have a full ROS or PE completed by myself.         "

## 2024-01-16 NOTE — PROGRESS NOTES
Pharmacy Medication History Review    Luther Ulloa is a 58 y.o. female admitted for No Principal Problem: There is no principal problem currently on the Problem List. Please update the Problem List and refresh.. Pharmacy reviewed the patient's [id number] medications and allergies for accuracy.    The list below reflectives the updated PTA list. Please review each medication in order reconciliation for additional clarification and justification.  (Not in a hospital admission)       The list below reflectives the updated allergy list. Please review each documented allergy for additional clarification and justification.  Allergies  Reviewed by Isela Saenz CPhT on 1/16/2024        Severity Reactions Comments    Bee Venom Protein (honey Bee) Medium Swelling     House Dust Low Cough     Pollen Extracts Low Cough             Below are additional concerns with the patient's PTA list.    See PTA med list    Isela Saenz CPhT

## 2024-01-16 NOTE — H&P
History Of Present Illness  Luther Ulloa is a 58-year-old female who presents to the emergency department with complaints of nausea, vomiting and abdominal pain.  Patient reports she was seen in the emergency department yesterday and was diagnosed with influenza A and was discharged home.  Patient reports she is unable to eat or drink anything due to the vomiting.  Patient states abdominal pain started after repeatedly vomiting.  She reports vomiting 5-6 times today.  She denies any diarrhea.  Her last bowel movement was 4 days ago.  Patient states she has had a decreased appetite.  She has been passing gas.  She also reports a fever of 101 last night.  She denies any past medical history.     ED Course  VS reviewed  Labs reviewed, results below, potassium 3.3, positive for Flu A  EKG unavailable for review  Potassium 40 mEq IV  IVF LR  Morphine  Tylenol  Zofran  Protonix     Past Medical History  Past Medical History:   Diagnosis Date    Benign neoplasm of unspecified breast     Breast fibroadenoma    Endometriosis     found at time of hyst; involved small bowel    Personal history of other specified conditions     History of fibrocystic disease of breast       Surgical History  Past Surgical History:   Procedure Laterality Date    BREAST SURGERY  06/25/2015    Breast Surgery    HYSTERECTOMY  03/23/2017    total Hysterectomy    INCISIONAL BREAST BIOPSY  06/25/2015    Incisional Breast Biopsy    OTHER SURGICAL HISTORY  06/08/2015    Surgical Excision Of Ectopic Pregnancy Unspecified Location    OTHER SURGICAL HISTORY  12/17/2020    Complete colonoscopy    SALPINGOOPHORECTOMY Left 2015    @ time of hysterectomy    SMALL INTESTINE SURGERY  2015    at time of hyst, found endometriosis    US GUIDED THYROID BIOPSY  01/30/2020    US GUIDED THYROID BIOPSY 1/30/2020 [facility]        Social History  She reports that she has never smoked. She has never used smokeless tobacco. She reports current alcohol use. She  reports that she does not use drugs.  She lives at home with her 2 sons    Family History  Family History   Problem Relation Name Age of Onset    Lung cancer Mother      Uterine cancer Mother      Brain cancer Mother      Amblyopia Father      Diabetes Father      Multiple myeloma Sister          Allergies  Bee venom protein (honey bee), House dust, and Pollen extracts    Review of Systems   Constitutional:  Positive for appetite change and fever.   Eyes: Negative.    Respiratory:  Positive for cough. Negative for shortness of breath.    Cardiovascular: Negative.    Gastrointestinal:  Positive for abdominal pain, nausea and vomiting.   Endocrine: Negative.    Genitourinary: Negative.    Musculoskeletal: Negative.    Skin: Negative.    Allergic/Immunologic: Negative.    Neurological: Negative.    Hematological: Negative.    Psychiatric/Behavioral: Negative.          Physical Exam  Constitutional:       General: She is not in acute distress.     Appearance: Normal appearance.   HENT:      Head: Normocephalic.      Nose: Nose normal.      Mouth/Throat:      Mouth: Mucous membranes are moist.      Pharynx: Oropharynx is clear.   Eyes:      Extraocular Movements: Extraocular movements intact.   Cardiovascular:      Rate and Rhythm: Normal rate and regular rhythm.      Pulses: Normal pulses.   Pulmonary:      Effort: Pulmonary effort is normal.   Abdominal:      General: Bowel sounds are normal.      Palpations: Abdomen is soft.      Tenderness: There is no abdominal tenderness.   Musculoskeletal:         General: Normal range of motion.      Cervical back: Normal range of motion.   Skin:     General: Skin is warm and dry.      Capillary Refill: Capillary refill takes less than 2 seconds.   Neurological:      General: No focal deficit present.      Mental Status: She is alert and oriented to person, place, and time.   Psychiatric:         Mood and Affect: Mood normal.         Behavior: Behavior normal.          Last  "Recorded Vitals  Blood pressure 150/68, pulse 61, temperature 37.7 °C (99.9 °F), temperature source Temporal, resp. rate 19, height 1.575 m (5' 2\"), weight 58.1 kg (128 lb), SpO2 100 %.    Relevant Results  Results for orders placed or performed during the hospital encounter of 01/16/24 (from the past 24 hour(s))   CBC and Auto Differential   Result Value Ref Range    WBC 3.6 (L) 4.4 - 11.3 x10*3/uL    nRBC 0.0 0.0 - 0.0 /100 WBCs    RBC 5.07 4.00 - 5.20 x10*6/uL    Hemoglobin 15.3 12.0 - 16.0 g/dL    Hematocrit 45.0 36.0 - 46.0 %    MCV 89 80 - 100 fL    MCH 30.2 26.0 - 34.0 pg    MCHC 34.0 32.0 - 36.0 g/dL    RDW 12.9 11.5 - 14.5 %    Platelets 161 150 - 450 x10*3/uL    Neutrophils % 64.4 40.0 - 80.0 %    Immature Granulocytes %, Automated 0.3 0.0 - 0.9 %    Lymphocytes % 25.8 13.0 - 44.0 %    Monocytes % 9.5 2.0 - 10.0 %    Eosinophils % 0.0 0.0 - 6.0 %    Basophils % 0.0 0.0 - 2.0 %    Neutrophils Absolute 2.30 1.20 - 7.70 x10*3/uL    Immature Granulocytes Absolute, Automated 0.01 0.00 - 0.70 x10*3/uL    Lymphocytes Absolute 0.92 (L) 1.20 - 4.80 x10*3/uL    Monocytes Absolute 0.34 0.10 - 1.00 x10*3/uL    Eosinophils Absolute 0.00 0.00 - 0.70 x10*3/uL    Basophils Absolute 0.00 0.00 - 0.10 x10*3/uL   Comprehensive metabolic panel   Result Value Ref Range    Glucose 83 74 - 99 mg/dL    Sodium 138 136 - 145 mmol/L    Potassium 3.3 (L) 3.5 - 5.3 mmol/L    Chloride 101 98 - 107 mmol/L    Bicarbonate 27 21 - 32 mmol/L    Anion Gap 13 10 - 20 mmol/L    Urea Nitrogen 11 6 - 23 mg/dL    Creatinine 0.87 0.50 - 1.05 mg/dL    eGFR 78 >60 mL/min/1.73m*2    Calcium 8.9 8.6 - 10.3 mg/dL    Albumin 4.3 3.4 - 5.0 g/dL    Alkaline Phosphatase 46 33 - 110 U/L    Total Protein 7.7 6.4 - 8.2 g/dL    AST 38 9 - 39 U/L    Bilirubin, Total 0.5 0.0 - 1.2 mg/dL    ALT 18 7 - 45 U/L   Lipase   Result Value Ref Range    Lipase 39 9 - 82 U/L   Magnesium   Result Value Ref Range    Magnesium 1.85 1.60 - 2.40 mg/dL   Lactate   Result Value " Ref Range    Lactate 0.9 0.4 - 2.0 mmol/L   Protime-INR   Result Value Ref Range    Protime 13.2 (H) 9.8 - 12.8 seconds    INR 1.2 (H) 0.9 - 1.1   aPTT   Result Value Ref Range    aPTT 41 (H) 27 - 38 seconds   ECG 12 lead    Result Date: 1/16/2024  Sinus rhythm Probable left atrial enlargement    CT abdomen pelvis w IV contrast    Result Date: 1/15/2024  Interpreted By:  Finkelstein, Evan, STUDY: CT ABDOMEN PELVIS W IV CONTRAST;  1/15/2024 9:01 pm   INDICATION: Signs/Symptoms:diffuse abd pain with vomiting.   COMPARISON: CT abdomen pelvis 12/01/2020   ACCESSION NUMBER(S): [id number]   ORDERING CLINICIAN: KAVEH HAYWOOD   TECHNIQUE: Axial CT images of the abdomen and pelvis with coronal and sagittal reconstructed images obtained after intravenous administration of 75 mL Omnipaque 350   FINDINGS: LOWER CHEST: Incompletely visualized ground-glass opacities in the right lower lobe.   ABDOMEN:   LIVER: Stable indeterminate 1.3 cm hypodensity in the inferior aspect of the right lower lobe compared to prior imaging in 2020. Otherwise, normal attenuation and contour. BILE DUCTS: Normal caliber. GALLBLADDER: No calcified gallstones. No wall thickening. PORTAL VEIN: Patent SPLEEN: Unremarkable. PANCREAS: Unremarkable. ADRENALS: Unremarkable. KIDNEYS, URETERS and URINARY BLADDER: Symmetric renal enhancement. No hydronephrosis or perinephric fluid collection.  Bladder is within normal limits REPRODUCTIVE ORGANS: The uterus is surgically absent. No significant free pelvic fluid.   ABDOMINAL WALL: Within normal limits. PERITONEUM: No ascites, free air or fluid collection.   BOWEL: No dilated bowel. Sutures again seen involving bowel the lower pelvis. No definite bowel wall thickening. The appendix is not definitively visualized, without focal pericecal inflammatory stranding.   VESSELS: No aortic aneurysm. RETROPERITONEUM: No pathologically enlarged retroperitoneal lymph nodes.   BONES: No acute osseous abnormality.       No  acute inflammatory process throughout the abdomen or pelvis.   Incompletely visualized ground-glass opacities in the right lower lobe, which may be infectious or inflammatory in etiology.     MACRO: None.   Signed by: Evan Finkelstein 1/15/2024 9:28 PM Dictation workstation:   [id number]    XR chest 2 views    Result Date: 1/15/2024  Interpreted By:  Jose Ware, STUDY: XR CHEST 2 VIEWS;  1/15/2024 7:45 pm   INDICATION: Signs/Symptoms:Chest pain.   COMPARISON: Chest radiograph 09/27/2018   ACCESSION NUMBER(S): [id number]   ORDERING CLINICIAN: JEEVAN JIMENEZ   FINDINGS: The heart is normal in size. Mild perihilar and interstitial prominence with scattered hazy opacities. No large pleural fluid or discernible pneumothorax. No acute osseous abnormality identified.       1. Mild perihilar and interstitial prominence with scattered hazy opacities which may represent mild edema or atypical infectious process.     Signed by: Jose Ware 1/15/2024 8:04 PM Dictation workstation:   [id number]      Scheduled medications  cholecalciferol, 2,000 Units, oral, Daily  docusate sodium, 100 mg, oral, BID  morphine, 4 mg, intravenous, Once  multivitamin with minerals, 1 tablet, oral, Daily  oseltamivir, 75 mg, oral, BID  [START ON 1/17/2024] pantoprazole, 40 mg, oral, Daily before breakfast   Or  [START ON 1/17/2024] pantoprazole, 40 mg, intravenous, Daily before breakfast  potassium chloride, 20 mEq, intravenous, q2h      Continuous medications  sodium chloride 0.9%, 100 mL/hr      PRN medications  PRN medications: acetaminophen **OR** acetaminophen **OR** acetaminophen, benzonatate, metoclopramide **OR** metoclopramide, ondansetron ODT **OR** ondansetron          Assessment/Plan   Principal Problem:    Nausea & vomiting  Influenza A  Admit to general medicine under Dr. Geremias Crandall, observation  Continue IVF NS  NPO except ice chips and sips of clear liquids, may advance diet as tolerated  Zofran  Trend potassium, replace as  needed  CBC and CMP in AM    DVT prophylaxis  Low risk  Ambulate  SCDs           I spent 25 minutes in the professional and overall care of this patient.      Kiara Pelletier, APRN-CNP

## 2024-01-16 NOTE — ED NOTES
ASSUMED CARE OF PT., PT. IN ED FOR CP. PT. STATE CP STARTED SUNDAY 1/14/24, THAT COMES AND GOES, TODAY PT. STATES SHE WOKE UP W/ 6/10 LT SIDED CP THAT RADIATES DOWN LT ARM, FEELS SHARP, W/ SOB, N/V, AND LIGHTHEADEDNESS. PT. ON CARDIAC MONITOR READING NSR W/ HR OF 65. PT. BREATHING UNLABORED, SPEAKING IN FULL SENTENCES, B/L LUNG SOUNDS CLEAR, PULSE OX READING 100% ON RA, W/ NON PRODUCTIVE COUGH. PT. A&O X4, DENIES DIARRHEA, HA, NUMBNESS/TINGLING. PT. ORIENTED TO ED, CALL LIGHT IN REACH, FAMILY AT BEDSIDE.     Melida Prado RN  01/15/24 [id number]

## 2024-01-16 NOTE — DISCHARGE INSTRUCTIONS
You were seen in the emergency room today and diagnosed with Influenza A.  For this, you should quarantine for at least 5 days from symptom onset.  After these 5 days, you may return to normal activities as long as you are fever free.  There is no antibiotic treatment for influenza A and can be well-controlled with supportive care including adequate rest, hydration, and ibuprofen for fevers.  A prescription for Ibuprofen was given to you at discharge. Reasons to return to the Emergency Department include chest pain, shortness of breath, worsening symptoms.  Please follow-up with your primary care provider.

## 2024-01-16 NOTE — ED PROVIDER NOTES
HPI   Chief Complaint   Patient presents with    Abdominal Pain    Coughing Up Blood     Patient arrives from home with complaints of abdominal pain and nausea and vomiting. Patient also notes small specs of blood in her sputum and in her emesis.  Patient was just here in E.R. last hs for abdominal pain.       HPI     58-year-old female presents today with chief complaint of nausea and vomiting.  Patient ports he was seen here yesterday for similar symptoms where she was diagnosed with flu and sent home with medications to treat her symptoms.  She states that today she has continued had vomiting and noticed some specks of blood in her vomit so she came in to be reevaluated.  Patient states that she has had poor p.o. intake in the setting of her recent flu diagnosis.  She states she has had some epigastric discomfort with the repeated vomiting.  No bright red blood and no blood clots.  No change in bowel habits or bowel movements.  Patient states that she was not sent home with anything for her nausea which she thinks contributed to the recurrent vomiting that she has been having.               [place] Coma Scale Score: 15   HEART Score: 2                Patient History   Past Medical History:   Diagnosis Date    Benign neoplasm of unspecified breast     Breast fibroadenoma    Endometriosis     found at time of hyst; involved small bowel    Personal history of other specified conditions     History of fibrocystic disease of breast     Past Surgical History:   Procedure Laterality Date    BREAST SURGERY  06/25/2015    Breast Surgery    HYSTERECTOMY  03/23/2017    total Hysterectomy    INCISIONAL BREAST BIOPSY  06/25/2015    Incisional Breast Biopsy    OTHER SURGICAL HISTORY  06/08/2015    Surgical Excision Of Ectopic Pregnancy Unspecified Location    OTHER SURGICAL HISTORY  12/17/2020    Complete colonoscopy    SALPINGOOPHORECTOMY Left 2015    @ time of hysterectomy    SMALL INTESTINE SURGERY  2015    at time of hyst,  found endometriosis    US GUIDED THYROID BIOPSY  01/30/2020    US GUIDED THYROID BIOPSY 1/30/2020 [facility]     Family History   Problem Relation Name Age of Onset    Lung cancer Mother      Uterine cancer Mother      Brain cancer Mother      Amblyopia Father      Diabetes Father      Multiple myeloma Sister       Social History     Tobacco Use    Smoking status: Never    Smokeless tobacco: Never   Substance Use Topics    Alcohol use: Yes     Comment: different drinks per week    Drug use: Never       Physical Exam   ED Triage Vitals [01/16/24 1452]   Temp Heart Rate Resp BP   37.7 °C (99.9 °F) 64 18 113/58      SpO2 Temp Source Heart Rate Source Patient Position   99 % Temporal Monitor Sitting      BP Location FiO2 (%)     Right arm --       Physical Exam  Vitals and nursing note reviewed.   Constitutional:       General: She is not in acute distress.     Appearance: She is well-developed.   HENT:      Head: Normocephalic and atraumatic.   Eyes:      Conjunctiva/sclera: Conjunctivae normal.   Cardiovascular:      Rate and Rhythm: Normal rate and regular rhythm.      Heart sounds: No murmur heard.  Pulmonary:      Effort: Pulmonary effort is normal. No respiratory distress.      Breath sounds: Normal breath sounds.   Abdominal:      Palpations: Abdomen is soft.      Tenderness: There is abdominal tenderness in the epigastric area.   Musculoskeletal:         General: No swelling.      Cervical back: Neck supple.   Skin:     General: Skin is warm and dry.      Capillary Refill: Capillary refill takes less than 2 seconds.   Neurological:      Mental Status: She is alert.   Psychiatric:         Mood and Affect: Mood normal.         ED Course & MDM   Diagnoses as of 01/17/24 2105   Nausea and vomiting, unspecified vomiting type   Influenza A       Medical Decision Making  Patient is a pleasant 58-year-old female presenting as above on arrival here hemodynamically she is stable.  On bedside assessment she is in no  acute distress, mildly uncomfortable.  She has had recurrent vomiting at home with resulting blood specks.  Concern for esophagitis, Macy-Jhaveri.  Her abdominal exam is reassuring.  Will get labs and given fluids, pantoprazole and medication for nausea and pain.  She will need to be able to p.o. challenge here for disposition.  Labs obtained and pending.    Patient's workup reviewed, ultimately reassuring however patient still unable to tolerate p.o. discussed options and she is preference for admission for which is pursued.  Admitted for observation and continued care.    Procedure  Procedures     Yoana Warner MD  01/17/24 2348

## 2024-01-16 NOTE — ED TRIAGE NOTES
Patient arrives from home with complaints of abdominal pain and nausea and vomiting. Patient also notes small specs of blood in her sputum and in her emesis.  Patient was just here in E.R. last hs for abdominal pain.

## 2024-01-17 LAB
ANION GAP SERPL CALC-SCNC: 15 MMOL/L (ref 10–20)
BUN SERPL-MCNC: 9 MG/DL (ref 6–23)
CALCIUM SERPL-MCNC: 7.7 MG/DL (ref 8.6–10.3)
CHLORIDE SERPL-SCNC: 107 MMOL/L (ref 98–107)
CO2 SERPL-SCNC: 20 MMOL/L (ref 21–32)
CREAT SERPL-MCNC: 0.73 MG/DL (ref 0.5–1.05)
EGFRCR SERPLBLD CKD-EPI 2021: >90 ML/MIN/1.73M*2
ERYTHROCYTE [DISTWIDTH] IN BLOOD BY AUTOMATED COUNT: 13.1 % (ref 11.5–14.5)
GLUCOSE SERPL-MCNC: 66 MG/DL (ref 74–99)
HCT VFR BLD AUTO: 35.8 % (ref 36–46)
HGB BLD-MCNC: 12 G/DL (ref 12–16)
MCH RBC QN AUTO: 30.5 PG (ref 26–34)
MCHC RBC AUTO-ENTMCNC: 33.5 G/DL (ref 32–36)
MCV RBC AUTO: 91 FL (ref 80–100)
NRBC BLD-RTO: 0 /100 WBCS (ref 0–0)
PLATELET # BLD AUTO: 129 X10*3/UL (ref 150–450)
POTASSIUM SERPL-SCNC: 3.6 MMOL/L (ref 3.5–5.3)
RBC # BLD AUTO: 3.94 X10*6/UL (ref 4–5.2)
SODIUM SERPL-SCNC: 138 MMOL/L (ref 136–145)
WBC # BLD AUTO: 5.4 X10*3/UL (ref 4.4–11.3)

## 2024-01-17 PROCEDURE — 96375 TX/PRO/DX INJ NEW DRUG ADDON: CPT | Performed by: INTERNAL MEDICINE

## 2024-01-17 PROCEDURE — 80048 BASIC METABOLIC PNL TOTAL CA: CPT | Performed by: NURSE PRACTITIONER

## 2024-01-17 PROCEDURE — 2500000004 HC RX 250 GENERAL PHARMACY W/ HCPCS (ALT 636 FOR OP/ED): Performed by: NURSE PRACTITIONER

## 2024-01-17 PROCEDURE — 36415 COLL VENOUS BLD VENIPUNCTURE: CPT | Performed by: NURSE PRACTITIONER

## 2024-01-17 PROCEDURE — 96376 TX/PRO/DX INJ SAME DRUG ADON: CPT | Performed by: INTERNAL MEDICINE

## 2024-01-17 PROCEDURE — 2500000004 HC RX 250 GENERAL PHARMACY W/ HCPCS (ALT 636 FOR OP/ED): Performed by: INTERNAL MEDICINE

## 2024-01-17 PROCEDURE — 85027 COMPLETE CBC AUTOMATED: CPT | Performed by: NURSE PRACTITIONER

## 2024-01-17 PROCEDURE — C9113 INJ PANTOPRAZOLE SODIUM, VIA: HCPCS | Performed by: NURSE PRACTITIONER

## 2024-01-17 PROCEDURE — G0378 HOSPITAL OBSERVATION PER HR: HCPCS

## 2024-01-17 PROCEDURE — 2500000001 HC RX 250 WO HCPCS SELF ADMINISTERED DRUGS (ALT 637 FOR MEDICARE OP): Performed by: PHYSICIAN ASSISTANT

## 2024-01-17 RX ORDER — ALBUTEROL SULFATE 90 UG/1
2 AEROSOL, METERED RESPIRATORY (INHALATION) 4 TIMES DAILY PRN
Status: DISCONTINUED | OUTPATIENT
Start: 2024-01-17 | End: 2024-01-18 | Stop reason: HOSPADM

## 2024-01-17 RX ORDER — ALBUTEROL SULFATE 90 UG/1
2 AEROSOL, METERED RESPIRATORY (INHALATION)
Status: DISCONTINUED | OUTPATIENT
Start: 2024-01-17 | End: 2024-01-17

## 2024-01-17 RX ADMIN — METOCLOPRAMIDE HYDROCHLORIDE 10 MG: 5 INJECTION INTRAMUSCULAR; INTRAVENOUS at 07:37

## 2024-01-17 RX ADMIN — METHYLPREDNISOLONE SODIUM SUCCINATE 40 MG: 40 INJECTION, POWDER, FOR SOLUTION INTRAMUSCULAR; INTRAVENOUS at 22:56

## 2024-01-17 RX ADMIN — METOCLOPRAMIDE HYDROCHLORIDE 10 MG: 5 INJECTION INTRAMUSCULAR; INTRAVENOUS at 14:46

## 2024-01-17 RX ADMIN — SODIUM CHLORIDE 100 ML/HR: 9 INJECTION, SOLUTION INTRAVENOUS at 13:39

## 2024-01-17 RX ADMIN — OSELTAMIVIR 75 MG: 75 CAPSULE ORAL at 22:56

## 2024-01-17 RX ADMIN — ONDANSETRON 4 MG: 2 INJECTION INTRAMUSCULAR; INTRAVENOUS at 05:31

## 2024-01-17 RX ADMIN — PANTOPRAZOLE SODIUM 40 MG: 40 INJECTION, POWDER, FOR SOLUTION INTRAVENOUS at 06:23

## 2024-01-17 RX ADMIN — OSELTAMIVIR 75 MG: 75 CAPSULE ORAL at 10:18

## 2024-01-17 RX ADMIN — METHYLPREDNISOLONE SODIUM SUCCINATE 40 MG: 40 INJECTION, POWDER, FOR SOLUTION INTRAMUSCULAR; INTRAVENOUS at 13:39

## 2024-01-17 RX ADMIN — Medication 5 MG: at 22:56

## 2024-01-17 ASSESSMENT — COGNITIVE AND FUNCTIONAL STATUS - GENERAL: MOBILITY SCORE: 24

## 2024-01-17 ASSESSMENT — PAIN SCALES - GENERAL
PAINLEVEL_OUTOF10: 0 - NO PAIN
PAINLEVEL_OUTOF10: 0 - NO PAIN

## 2024-01-17 NOTE — CARE PLAN
The patient's goals for the shift include      The clinical goals for the shift include maintain comfort    Problem: Pain - Adult  Goal: Verbalizes/displays adequate comfort level or baseline comfort level  Outcome: Progressing     Problem: Safety - Adult  Goal: Free from fall injury  Outcome: Progressing     Problem: Discharge Planning  Goal: Discharge to home or other facility with appropriate resources  Outcome: Progressing     Problem: Chronic Conditions and Co-morbidities  Goal: Patient's chronic conditions and co-morbidity symptoms are monitored and maintained or improved  Outcome: Progressing     Problem: Pain  Goal: Takes deep breaths with improved pain control throughout the shift  Outcome: Progressing  Goal: Turns in bed with improved pain control throughout the shift  Outcome: Progressing  Goal: Walks with improved pain control throughout the shift  Outcome: Progressing  Goal: Performs ADL's with improved pain control throughout shift  Outcome: Progressing  Goal: Free from opioid side effects throughout the shift  Outcome: Progressing  Goal: Free from acute confusion related to pain meds throughout the shift  Outcome: Progressing

## 2024-01-18 VITALS
DIASTOLIC BLOOD PRESSURE: 59 MMHG | HEIGHT: 62 IN | BODY MASS INDEX: 23.55 KG/M2 | WEIGHT: 128 LBS | SYSTOLIC BLOOD PRESSURE: 149 MMHG | TEMPERATURE: 98.6 F | RESPIRATION RATE: 18 BRPM | OXYGEN SATURATION: 96 % | HEART RATE: 52 BPM

## 2024-01-18 PROCEDURE — 2500000004 HC RX 250 GENERAL PHARMACY W/ HCPCS (ALT 636 FOR OP/ED): Performed by: INTERNAL MEDICINE

## 2024-01-18 PROCEDURE — C9113 INJ PANTOPRAZOLE SODIUM, VIA: HCPCS | Performed by: NURSE PRACTITIONER

## 2024-01-18 PROCEDURE — 2500000004 HC RX 250 GENERAL PHARMACY W/ HCPCS (ALT 636 FOR OP/ED): Performed by: NURSE PRACTITIONER

## 2024-01-18 PROCEDURE — 96376 TX/PRO/DX INJ SAME DRUG ADON: CPT | Performed by: INTERNAL MEDICINE

## 2024-01-18 PROCEDURE — G0378 HOSPITAL OBSERVATION PER HR: HCPCS

## 2024-01-18 RX ORDER — PREDNISONE 10 MG/1
10 TABLET ORAL 2 TIMES DAILY
Qty: 12 TABLET | Refills: 0 | Status: SHIPPED | OUTPATIENT
Start: 2024-01-18 | End: 2024-01-19 | Stop reason: SDUPTHER

## 2024-01-18 RX ORDER — PREDNISONE 10 MG/1
10 TABLET ORAL 2 TIMES DAILY
Status: DISCONTINUED | OUTPATIENT
Start: 2024-01-18 | End: 2024-01-18 | Stop reason: HOSPADM

## 2024-01-18 RX ORDER — OSELTAMIVIR PHOSPHATE 75 MG/1
75 CAPSULE ORAL 2 TIMES DAILY
Qty: 6 CAPSULE | Refills: 0 | Status: SHIPPED | OUTPATIENT
Start: 2024-01-18 | End: 2024-01-19 | Stop reason: SDUPTHER

## 2024-01-18 RX ADMIN — METHYLPREDNISOLONE SODIUM SUCCINATE 40 MG: 40 INJECTION, POWDER, FOR SOLUTION INTRAMUSCULAR; INTRAVENOUS at 12:45

## 2024-01-18 RX ADMIN — PANTOPRAZOLE SODIUM 40 MG: 40 INJECTION, POWDER, FOR SOLUTION INTRAVENOUS at 06:49

## 2024-01-18 RX ADMIN — OSELTAMIVIR 75 MG: 75 CAPSULE ORAL at 18:10

## 2024-01-18 RX ADMIN — OSELTAMIVIR 75 MG: 75 CAPSULE ORAL at 08:25

## 2024-01-18 RX ADMIN — SODIUM CHLORIDE 100 ML/HR: 9 INJECTION, SOLUTION INTRAVENOUS at 08:25

## 2024-01-18 RX ADMIN — METHYLPREDNISOLONE SODIUM SUCCINATE 40 MG: 40 INJECTION, POWDER, FOR SOLUTION INTRAMUSCULAR; INTRAVENOUS at 06:52

## 2024-01-18 ASSESSMENT — PAIN SCALES - GENERAL: PAINLEVEL_OUTOF10: 0 - NO PAIN

## 2024-01-18 NOTE — PROGRESS NOTES
Call made to bedside. Patient alert & oriented x3. This TCC introduced myself and my role. Assessment information gathered for discharge.  Patient elly here from home, where she lives with her 2 sons-who are currently in college. Patient is independently with ADL/IADLs. Pateint drives. Denies concerns or needs going home. Patent states she will have a ride at discharge. Care Transitions will continue to follow during course of hospital stay for any changes to discharge needs.  Floridalma Duron RN

## 2024-01-18 NOTE — PROGRESS NOTES
Luther Ulloa is a 58 y.o. female on day 0 of admission presenting with Nausea & vomiting.      Subjective   Patient is fully awake and alert oriented x 3, in no distress, continues to have significant nausea feeling.  Had 1 episode of vomiting this morning.  Has a nonproductive cough.  Denies any shortness of breath and he is on room air.       Objective   Patient is fully awake and alert oriented x 3    Head and neck within normal limits    Lungs bilateral diffuse end expiratory wheezing    Heart regular S1-S2    Abdomen soft and nontender    Extremities no edema  Last Recorded Vitals  /80   Pulse 50   Temp 37.8 °C (100 °F) (Temporal)   Resp 18   Wt 58.1 kg (128 lb)   SpO2 97%   Intake/Output last 3 Shifts:    Intake/Output Summary (Last 24 hours) at 1/17/2024 2209  Last data filed at 1/17/2024 1000  Gross per 24 hour   Intake 1032 ml   Output --   Net 1032 ml       Admission Weight  Weight: 58.1 kg (128 lb) (01/16/24 1452)    Daily Weight  01/16/24 : 58.1 kg (128 lb)    Image Results  ECG 12 lead  Sinus rhythm  Probable left atrial enlargement    See ED provider note for full interpretation and clinical correlation  Confirmed by Verito Kelly ([id number]) on 1/16/2024 7:09:14 PM      Physical Exam    Relevant Results               Assessment/Plan    #1 influenza A currently on Tamiflu    2.  Persistent nausea and vomiting, on IV fluid and antiemetics    3.  Acute bronchitis due to 1, continue with IV Solu-Medrol, aerosol treatments      Patient would be started on a full liquid diet .        Principal Problem:    Nausea & vomiting                  Jorge L Archuleta MD

## 2024-01-19 ENCOUNTER — PATIENT OUTREACH (OUTPATIENT)
Dept: CARE COORDINATION | Facility: CLINIC | Age: 59
End: 2024-01-19
Payer: COMMERCIAL

## 2024-01-19 RX ORDER — PREDNISONE 10 MG/1
10 TABLET ORAL 2 TIMES DAILY
Qty: 12 TABLET | Refills: 0 | Status: SHIPPED | OUTPATIENT
Start: 2024-01-19

## 2024-01-19 RX ORDER — OSELTAMIVIR PHOSPHATE 75 MG/1
75 CAPSULE ORAL 2 TIMES DAILY
Qty: 6 CAPSULE | Refills: 0 | Status: SHIPPED | OUTPATIENT
Start: 2024-01-19 | End: 2024-01-22

## 2024-01-19 NOTE — PROGRESS NOTES
Outreach call to patient to support a smooth transition of care from recent admission. Enrolled patient in [employer] chatbot for additional support and education through transition period.  Will continue to monitor through transition period.

## 2024-01-19 NOTE — DISCHARGE SUMMARY
Discharge Diagnosis  Nausea & vomiting    Issues Requiring Follow-Up  Follow-up at the office in a week to 10 days regarding the influenza A on the subsequent bronchospasm/bronchitis.    Discharge Meds     Your medication list        START taking these medications        Instructions Last Dose Given Next Dose Due   famotidine 20 mg tablet  Commonly known as: Pepcid      Take 1 tablet (20 mg) by mouth once daily.       metoclopramide 10 mg tablet  Commonly known as: Reglan      Take 1 tablet (10 mg) by mouth every 8 hours if needed (Nausea/Vomiting).       ondansetron ODT 4 mg disintegrating tablet  Commonly known as: Zofran-ODT      Take 1 tablet (4 mg) by mouth every 8 hours if needed for nausea or vomiting.       oseltamivir 75 mg capsule  Commonly known as: Tamiflu      Take 1 capsule (75 mg) by mouth 2 times a day for 6 doses.       pantoprazole 20 mg EC tablet  Commonly known as: ProtoNix      Take 1 tablet (20 mg) by mouth once daily in the morning. Take before meals. Do not crush, chew, or split.       predniSONE 10 mg tablet  Commonly known as: Deltasone      Take 1 tablet (10 mg) by mouth 2 times a day for 6 days.              CONTINUE taking these medications        Instructions Last Dose Given Next Dose Due   cholecalciferol 50 mcg (2,000 unit) capsule  Commonly known as: Vitamin D-3           multivitamin with minerals tablet           UNABLE TO FIND                  STOP taking these medications      ibuprofen 400 mg tablet                  Where to Get Your Medications        These medications were sent to [facility]/pharmacy #[id number] - [address] AT [place] [id number]      Phone: [phone]   famotidine 20 mg tablet  metoclopramide 10 mg tablet  ondansetron ODT 4 mg disintegrating tablet  pantoprazole 20 mg EC tablet       You can get these medications from any pharmacy    Bring a paper prescription for each of these  medications  oseltamivir 75 mg capsule  predniSONE 10 mg tablet         Test Results Pending At Discharge  Pending Labs       No current pending labs.            Hospital Course   Luther Ulloa is a 58-year-old female who presents to the emergency department with complaints of nausea, vomiting and abdominal pain.  Patient reports she was seen in the emergency department yesterday and was diagnosed with influenza A and was discharged home.  Patient reports she is unable to eat or drink anything due to the vomiting.  Patient states abdominal pain started after repeatedly vomiting.  She reports vomiting 5-6 times today.  She denies any diarrhea.  Her last bowel movement was 4 days ago.  Patient states she has had a decreased appetite.  She has been passing gas.  She also reports a fever of 101 last night.  She denies any past medical history.  Patient was admitted to the regular medical floor.  Was hydrated with IV fluids while she was skipped n.p.o. overnight because of her repeated episodes of nausea and vomiting and she was medicated with antiemetics.  She was able to be started on clear liquid diet and it was gradually advanced.  Patient had significant bronchospasm with wheezing and was also treated with IV steroids and Tamiflu.  Patient was able to be discharged home in a stable condition on 1/18/2024 on a few more days course of Tamiflu and 3 days of oral steroids.  She was going to be followed up at the office at a later date.    Pertinent Physical Exam At Time of Discharge  Physical Exam    Outpatient Follow-Up  Future Appointments   Date Time Provider Department Center   1/31/2024  8:45 AM [id number]         Jorge L Archuleta MD

## 2024-01-26 LAB
ATRIAL RATE: 57 BPM
P AXIS: 6 DEGREES
PR INTERVAL: 130 MS
Q ONSET: 252 MS
QRS COUNT: 9 BEATS
QRS DURATION: 90 MS
QT INTERVAL: 448 MS
QTC CALCULATION(BAZETT): 437 MS
QTC FREDERICIA: 440 MS
R AXIS: 47 DEGREES
T AXIS: 77 DEGREES
T OFFSET: 476 MS
VENTRICULAR RATE: 57 BPM

## 2024-01-31 ENCOUNTER — CLINICAL SUPPORT (OUTPATIENT)
Dept: GENETICS | Facility: CLINIC | Age: 59
End: 2024-01-31
Payer: COMMERCIAL

## 2024-01-31 VITALS
HEIGHT: 62 IN | TEMPERATURE: 97.9 F | BODY MASS INDEX: 23.55 KG/M2 | WEIGHT: 128 LBS | SYSTOLIC BLOOD PRESSURE: 128 MMHG | DIASTOLIC BLOOD PRESSURE: 80 MMHG | HEART RATE: 93 BPM

## 2024-01-31 DIAGNOSIS — Z71.83 ENCOUNTER FOR NONPROCREATIVE GENETIC COUNSELING: Primary | ICD-10-CM

## 2024-01-31 DIAGNOSIS — Z80.9 FAMILY HISTORY OF CANCER: ICD-10-CM

## 2024-01-31 PROBLEM — Z80.49 FAMILY HISTORY OF UTERINE CANCER: Status: ACTIVE | Noted: 2024-01-31

## 2024-01-31 PROCEDURE — GENMD PR GENETICS VISIT (MEDICAID/MEDICARE): Performed by: GENETIC COUNSELOR, MS

## 2024-01-31 ASSESSMENT — ENCOUNTER SYMPTOMS
LOSS OF SENSATION IN FEET: 0
OCCASIONAL FEELINGS OF UNSTEADINESS: 0
DEPRESSION: 0

## 2024-01-31 ASSESSMENT — PATIENT HEALTH QUESTIONNAIRE - PHQ9
1. LITTLE INTEREST OR PLEASURE IN DOING THINGS: NOT AT ALL
2. FEELING DOWN, DEPRESSED OR HOPELESS: NOT AT ALL
SUM OF ALL RESPONSES TO PHQ9 QUESTIONS 1 & 2: 0

## 2024-01-31 NOTE — PROGRESS NOTES
"History of Present Illness:  Luther Ulloa is a 58 y.o. female with a family history of various cancers. Ms. Ulloa was referred to the Cancer Genetics Clinic at [facility] by Dr. Lauren Jarvis. Ms. Ulloa is interested in learning more about genetic testing to clarify their personal risk for cancer, as well as the risks to their family members.    Cancer Medical History:  Personal history of cancer? No.    Prior hereditary cancer genetic testing? No.    Cancer screening history:  Mammograms? Yes, last 3/2023 (BI-RADS1 [normal], extremely dense breast tissue).  PAP smear? Yes, in the past. No longer per Dr. Jarvis (2023). S/p TH-LSO in 2015 for benign indications. Right ovary intact (seen on MR pelvis 2021).  Colonoscopy? Yes, last 2023. May have prior history of \"1-2\" benign colon polyps per patient. Asked to return in 10 years after last colonoscopy.   Upper endoscopy? No.  Dermatology? No.  Other cancer screening? No.    Reproductive History:  Number of children: 2.  Number of pregnancies: 3 (ectopic pregnancy x1).  Age first birth: 29.  Breast feeding? Yes, both children.  Menarche (age): 17.  Menopause (age): mid 40s.  OCP: No. Used Depo shot.  HRT: No.    Hysterectomy? Yes-.  Oophorectomy? Yes-LSO in . Right ovary intact.    Family history:  A 4-generation pedigree was obtained and was significant for the following:   -Patient, no history of cancer;  -Sister, multiple myeloma at age 47 or 48, alive at 54;  -Mother, \"lung, throat, brain cancer\" in her 60s-unclear if multiple primaries but more likely metastatic disease given patient's description, former smoker,  at 68;  -Maternal grandfather, underwent surgery, developed surgical complications due to retained maternal post surgery, was found to have cancer throughout abdomen (\"stomach cancer\") per the patient,  in his later 50s;  -Maternal 1st cousin once removed (through [facility]), cancer NOS at older age,  in her " "80s;  -Father, no history of cancer, alive at 76;  -Paternal 1st cousin, leukemia as a child, alive at 52;  -Paternal grandmother, cancer NOS in her 70s,  at 78.    Ms. Ulloa is of African American/[place] (maternal) and /[place] (paternal) ancestry/ There is no known Ashkenazi Jew ancestry or consanguinity.    Genetic counseling:  Ms. Ulloa is a 58 y.o.-year-old female with a with a family history of various cancers, including \"lung, throat, brain cancer\" in her mother (with one or more of these cancers likely being metastatic) and multiple myeloma in her sister. Overall, her family history is most suggesting of sporadic cancer as opposed to hereditary cancer. Ms. Ulloa does not met current national (NCCN) criteria for testing of any well-known high-penetrance cancer genes.    Most cancer is NOT hereditary. Red flags associated with hereditary cancer include factors such as young age at diagnosis (at or prior to age 50), three or more family members with the same or related types of cancer (such as breast and ovarian cancers or colon and uterine cancers). The cancer in a family is more likely to be sporadic when different family members have different types of cancer and/or they are diagnosed with cancer later in life.    Overall, the family history is most suggestive of sporadic cancer. This means that genetic testing in Ms. Ulloa' family is unlikely to find a specific cause of any of the cancers in the family. Since Ms. Ulloa does not meet national (NCCN) criteria for testing of any well-known high-penetrance cancer genes, she was offered the option of pursuing genetic testing via self-pay (~$250).    We discussed that there are multiple genes associated with increased cancer risk. Some genes are considered highly penetrant cancer genes, meaning a mutation in the gene confers a high risk of cancer. On the other hand, there are other intermediate (moderate risk) cancer genes. For " many of the moderate risk genes, there is sometimes limited information regarding the degree to which a mutation in the gene affects risk of different types of cancers. Additionally, for some of these moderate risk genes, the appropriate management for individuals who have a mutation in one of these genes is not always clear. In many cases, even if an individual tests positive for a mutation in a moderate risk gene, recommendations are still based on the family history, not the positive test result.    Ms. Ulloa was counseled about hereditary cancer susceptibility including cancer risks, options for increased screening and/or risk reduction, genetic testing, and the implications for other family members.  We discussed performing testing for common causes of hereditary cancer as part of a multi-gene panel, such as the [employer] CancerNext panel (36 genes) or [employer] CancerNext-Expanded panel (77 genes).    We discussed the Genetic Information Nondiscrimination Act (WILMA). We discussed the protections and limitations of WILMA. WILMA generally makes in illegal for health insurance companies, group health plans, and most employers (with >15 employees) to discriminate based on genetic information. It does not protect against genetic discrimination for life insurance, disability insurance, long-term care, or other insurances.    After a discussion about the risks, benefits, and limitations of genetic testing,  Ms. Ulloa chose NOT to undergo genetic testing for hereditary cancer using at this time. She is aware that she can change her mind at any point, and if she does, she can reach out to us by phone, and we would be glad to coordinate testing. In the meantime, she should continue to follow her primary care providers' recommendations for all age-related cancer screenings. We also briefly discussed maintaining healthy eating habits, exercise, etc. which can help reduce the incidence of certain cancers.    We also briefly spoke  regarding her son's fathers' family histories. She is not aware that either of their fathers has a family history of cancer. At this time, no further screening or genetic testing is recommended for her sons.    Ms. Ulloa was asked to keep us apprised as to any changes to her personal and/or family history of cancer, as this could impact our recommendations for hereditary cancer testing and cancer screening.    PLAN:  1. Ms. Ulloa chose NOT to undergo genetic testing for hereditary cancer using at this time. She is aware that she can change her mind at any point, and if she does, she can reach out to us by phone, and we would be glad to coordinate testing.    2. Ms. Ulloa was reminded to follow her primary care providers' recommendations for age-related cancer screenings, such as mammograms, colonoscopies, etc.    3. Ms. Ulloa was asked to keep us apprised as to any changes to her personal and/or family history of cancer, as this could impact our recommendations for hereditary cancer testing and cancer screening.    4. We remain available to Ms. Ulloa at [phone] if any questions arise regarding information discussed at today's visit. Keerthi can be reached directly at [phone].    Keerthi Hennessy MS, [facility]  Genetic Counselor  [facility]  [phone]    Time spent with patient:  55 minutes (8:53-9:48 am), in person.

## 2024-11-04 ENCOUNTER — OFFICE VISIT (OUTPATIENT)
Dept: OBSTETRICS AND GYNECOLOGY | Facility: CLINIC | Age: 59
End: 2024-11-04
Payer: COMMERCIAL

## 2024-11-04 VITALS
HEIGHT: 63 IN | SYSTOLIC BLOOD PRESSURE: 124 MMHG | BODY MASS INDEX: 23.11 KG/M2 | DIASTOLIC BLOOD PRESSURE: 76 MMHG | HEART RATE: 67 BPM | WEIGHT: 130.4 LBS

## 2024-11-04 DIAGNOSIS — L98.9 SKIN LESION: ICD-10-CM

## 2024-11-04 DIAGNOSIS — Z23 NEED FOR COVID-19 VACCINE: Primary | ICD-10-CM

## 2024-11-04 DIAGNOSIS — R20.0 NUMBNESS AND TINGLING OF BOTH UPPER EXTREMITIES: ICD-10-CM

## 2024-11-04 DIAGNOSIS — R20.2 NUMBNESS AND TINGLING OF BOTH UPPER EXTREMITIES: ICD-10-CM

## 2024-11-04 PROCEDURE — 99213 OFFICE O/P EST LOW 20 MIN: CPT | Performed by: OBSTETRICS & GYNECOLOGY

## 2024-11-04 PROCEDURE — 3008F BODY MASS INDEX DOCD: CPT | Performed by: OBSTETRICS & GYNECOLOGY

## 2024-11-04 PROCEDURE — 91320 SARSCV2 VAC 30MCG TRS-SUC IM: CPT | Performed by: OBSTETRICS & GYNECOLOGY

## 2024-11-04 PROCEDURE — 1036F TOBACCO NON-USER: CPT | Performed by: OBSTETRICS & GYNECOLOGY

## 2024-11-04 ASSESSMENT — COLUMBIA-SUICIDE SEVERITY RATING SCALE - C-SSRS
1. IN THE PAST MONTH, HAVE YOU WISHED YOU WERE DEAD OR WISHED YOU COULD GO TO SLEEP AND NOT WAKE UP?: NO
6. HAVE YOU EVER DONE ANYTHING, STARTED TO DO ANYTHING, OR PREPARED TO DO ANYTHING TO END YOUR LIFE?: NO
2. HAVE YOU ACTUALLY HAD ANY THOUGHTS OF KILLING YOURSELF?: NO

## 2024-11-04 ASSESSMENT — ENCOUNTER SYMPTOMS
ENDOCRINE NEGATIVE: 0
NEUROLOGICAL NEGATIVE: 0
MUSCULOSKELETAL NEGATIVE: 0
HEMATOLOGIC/LYMPHATIC NEGATIVE: 0
PSYCHIATRIC NEGATIVE: 0
CONSTITUTIONAL NEGATIVE: 0
CARDIOVASCULAR NEGATIVE: 0
GASTROINTESTINAL NEGATIVE: 0
RESPIRATORY NEGATIVE: 0
ALLERGIC/IMMUNOLOGIC NEGATIVE: 0
EYES NEGATIVE: 0

## 2024-11-04 ASSESSMENT — PATIENT HEALTH QUESTIONNAIRE - PHQ9
1. LITTLE INTEREST OR PLEASURE IN DOING THINGS: NOT AT ALL
SUM OF ALL RESPONSES TO PHQ9 QUESTIONS 1 AND 2: 0
2. FEELING DOWN, DEPRESSED OR HOPELESS: NOT AT ALL

## 2024-11-04 ASSESSMENT — PAIN SCALES - GENERAL: PAINLEVEL_OUTOF10: 10-WORST PAIN EVER

## 2024-11-04 NOTE — PROGRESS NOTES
Covid vaccine per shaker  Given IM into right deltoid  Supplied by office  Patient tolerated well  VIS given     LOT #: [id number]  Expiration date: 04/03/2025

## 2024-11-11 ENCOUNTER — APPOINTMENT (OUTPATIENT)
Dept: OBSTETRICS AND GYNECOLOGY | Facility: CLINIC | Age: 59
End: 2024-11-11
Payer: COMMERCIAL

## 2024-11-14 NOTE — PROGRESS NOTES
"        Luther Ulloa presents today with:     Bilateral upper extremity numbness/tingling  No evidence of weakness bilaterally  Referral to sports medicine    Preventive health  Desires covid booster    Reports single skin lesion  Referral to dermatology                  -----------------------------------------------------------------------  HPI    Presents today with  Bilateral upper extremity changes - pain and numbness/tingling  States that it can sometimes happen overnight while sleeping, but can also happen during the day  Unsure if has associated weakness      Visit Vitals  /76   Pulse 67   Ht 1.6 m (5' 3\")   Wt 59.1 kg (130 lb 6.4 oz)   BMI 23.10 kg/m²   Smoking Status Never   BSA 1.62 m²       Physical Exam  Constitutional:       Appearance: Normal appearance.   HENT:      Head: Normocephalic and atraumatic.   Pulmonary:      Effort: Pulmonary effort is normal.   Musculoskeletal:         General: Normal range of motion.      Cervical back: Neck supple.   Neurological:      General: No focal deficit present.      Mental Status: She is alert.      Sensory: No sensory deficit.      Motor: No weakness.   Skin:     General: Skin is warm and dry.   Psychiatric:         Mood and Affect: Mood normal.             "

## 2024-11-15 ENCOUNTER — APPOINTMENT (OUTPATIENT)
Dept: ORTHOPEDIC SURGERY | Facility: HOSPITAL | Age: 59
End: 2024-11-15
Payer: COMMERCIAL

## 2024-11-15 ENCOUNTER — OFFICE VISIT (OUTPATIENT)
Dept: ORTHOPEDIC SURGERY | Facility: CLINIC | Age: 59
End: 2024-11-15
Payer: COMMERCIAL

## 2024-11-15 VITALS — BODY MASS INDEX: 23.92 KG/M2 | WEIGHT: 130 LBS | HEIGHT: 62 IN

## 2024-11-15 DIAGNOSIS — G56.03 BILATERAL CARPAL TUNNEL SYNDROME: Primary | ICD-10-CM

## 2024-11-15 DIAGNOSIS — R20.2 NUMBNESS AND TINGLING OF BOTH UPPER EXTREMITIES: ICD-10-CM

## 2024-11-15 DIAGNOSIS — R20.0 NUMBNESS AND TINGLING OF BOTH UPPER EXTREMITIES: ICD-10-CM

## 2024-11-15 PROCEDURE — 99213 OFFICE O/P EST LOW 20 MIN: CPT

## 2024-11-15 PROCEDURE — 1036F TOBACCO NON-USER: CPT

## 2024-11-15 PROCEDURE — 99203 OFFICE O/P NEW LOW 30 MIN: CPT

## 2024-11-15 PROCEDURE — 3008F BODY MASS INDEX DOCD: CPT

## 2024-11-15 NOTE — PROGRESS NOTES
Subjective    Patient ID: Luther Ulloa is a 59 y.o. female.    Chief Complaint: Numbness of the Left Hand and Numbness of the Right Hand     HPI  This is a pleasant 59-year-old right hand dominant female who presents to the office with continued complaints of bilateral hand and wrist pain as well as numbness and paresthesia.  States that both wrists hurt equally.  She has been seen in the past for carpal tunnel syndrome, at least 5 years ago and did obtain an EMG of bilateral upper extremities which demonstrated evidence of carpal tunnel syndrome including bilateral hands with a moderate degree on each side.  Patient also makes mention that she has a history of cervical disc disease and was following Dr. Galvez an orthopedic spine specialist.  States that bilateral wrist pain, numbness and paresthesia is progressively getting worse.  It is worse at night.  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  She has tried carpal tunnel braces and injections in the past with minimal relief of symptoms.    The patient's past medical, surgical, family, and social history as well as allergies and medications were reviewed and updated in the chart.    Objective   Ortho Exam  Pleasant in no acute distress.  Right and left hands appearing with some degree of intrinsic atrophy.  There is a significant positive Tinel's over the carpal tunnel bilaterally, right worse than left.  She has a positive carpal tunnel compression test bilaterally as well as a positive Phalen's exam bilaterally.  There is no gross deformity or function of the right wrist and hand.  Sensation is intact to light touch.    Assessment/Plan   Encounter Diagnoses:  Bilateral carpal tunnel syndrome    Numbness and tingling of both upper extremities    Plan: Discussion with patient in regards to worsening bilateral carpal tunnel syndrome with review of conservative and surgical treatment  options.  EMG nerve conduction test results in the past were discussed which showed moderate carpal tunnel syndrome bilaterally.  I discussed carpal tunnel injections, but patient states that she defers at this time as they have not been of benefit in the past.  I then explained to patient that her next option would be surgical intervention with a carpal tunnel release.  Patient states that she would like to think about carpal tunnel surgery, and she was provided Dr. Ovidio Hernandez's information if she wanted to follow-up and discuss surgery.  She can continue with carpal tunnel braces and ibuprofen and Tylenol.  She can follow-up with Dr. Ovidio Hernandez to discuss surgery.

## 2024-11-18 ENCOUNTER — OFFICE VISIT (OUTPATIENT)
Dept: ORTHOPEDIC SURGERY | Facility: CLINIC | Age: 59
End: 2024-11-18
Payer: COMMERCIAL

## 2024-11-18 DIAGNOSIS — G56.01 CARPAL TUNNEL SYNDROME ON RIGHT: ICD-10-CM

## 2024-11-18 DIAGNOSIS — G56.02 CARPAL TUNNEL SYNDROME ON LEFT: Primary | ICD-10-CM

## 2024-11-18 PROCEDURE — 99213 OFFICE O/P EST LOW 20 MIN: CPT | Performed by: ORTHOPAEDIC SURGERY

## 2024-11-18 PROCEDURE — 1036F TOBACCO NON-USER: CPT | Performed by: ORTHOPAEDIC SURGERY

## 2024-11-18 RX ORDER — BUPIVACAINE HYDROCHLORIDE 5 MG/ML
10 INJECTION, SOLUTION EPIDURAL; INTRACAUDAL ONCE
OUTPATIENT
Start: 2024-11-18 | End: 2024-11-18

## 2024-11-18 NOTE — PROGRESS NOTES
Subjective    Patient ID: Luther Ulloa is a 59 y.o. female.    Chief Complaint: Follow-up of the Right Hand and Follow-up of the Left Hand     Last Surgery: No surgery found  Last Surgery Date: No surgery found    HPI  Patient comes in for follow-up of her bilateral carpal tunnel syndrome.  She has been seen by the nurse practitioner.  She has been treated with braces for over 4 weeks as well as previous Kenalog injections by different physician.  She did have an EMG in 2019 which was positive for bilateral moderate degree carpal tunnel.  However now she would like to discuss surgery.    Objective   Ortho Exam  The patient is in no acute distress.  Exam of her left hand and wrist reveals her skin envelope is intact.  There is mild thenar atrophy.  There is no intrinsic atrophy.  She has a positive Tinel's test, a positive carpal tunnel compression test and a positive Phalen test.    Exam of her right hand and wrist reveals her skin envelope is intact.  She has no thenar or intrinsic atrophy.  She has a positive Tinel's test, a positive carpal tunnel compression test and a positive Phalen test.    Assessment/Plan   Encounter Diagnoses:  Carpal tunnel syndrome on left    Carpal tunnel syndrome on right    Orders Placed This Encounter    Request for Pre-Admission Testing Visit    Request for Pre-Admission Testing Visit    Case Request Operating Room: Decompression Median Nerve with Carpal Tunnel Release    Case Request Operating Room: Decompression Median Nerve with Carpal Tunnel Release     Patient has known bilateral carpal tunnel syndrome from an EMG done 5 years ago.  She has been through bracing as well as Kenalog injections.  She now would like to discuss surgery in both hands.  I discussed with her in detail the risk, benefits alternatives of staged, bilateral carpal tunnel releases.  The patient voiced understanding and informed consent was obtained.  The patient will call to schedule surgery.

## 2024-11-20 PROBLEM — G56.02 CARPAL TUNNEL SYNDROME ON LEFT: Status: ACTIVE | Noted: 2024-11-18

## 2024-11-20 PROBLEM — G56.01 CARPAL TUNNEL SYNDROME ON RIGHT: Status: ACTIVE | Noted: 2024-11-18

## 2024-12-04 ENCOUNTER — PRE-ADMISSION TESTING (OUTPATIENT)
Dept: PREADMISSION TESTING | Facility: HOSPITAL | Age: 59
End: 2024-12-04
Payer: COMMERCIAL

## 2024-12-04 VITALS
DIASTOLIC BLOOD PRESSURE: 76 MMHG | TEMPERATURE: 95 F | RESPIRATION RATE: 18 BRPM | HEIGHT: 62 IN | OXYGEN SATURATION: 100 % | WEIGHT: 127.87 LBS | BODY MASS INDEX: 23.53 KG/M2 | SYSTOLIC BLOOD PRESSURE: 127 MMHG | HEART RATE: 69 BPM

## 2024-12-04 DIAGNOSIS — Z01.818 PRE-OP TESTING: Primary | ICD-10-CM

## 2024-12-04 LAB
ANION GAP SERPL CALC-SCNC: 11 MMOL/L (ref 10–20)
BUN SERPL-MCNC: 8 MG/DL (ref 6–23)
CALCIUM SERPL-MCNC: 9.6 MG/DL (ref 8.6–10.3)
CHLORIDE SERPL-SCNC: 105 MMOL/L (ref 98–107)
CO2 SERPL-SCNC: 29 MMOL/L (ref 21–32)
CREAT SERPL-MCNC: 0.88 MG/DL (ref 0.5–1.05)
EGFRCR SERPLBLD CKD-EPI 2021: 76 ML/MIN/1.73M*2
ERYTHROCYTE [DISTWIDTH] IN BLOOD BY AUTOMATED COUNT: 13.1 % (ref 11.5–14.5)
GLUCOSE SERPL-MCNC: 89 MG/DL (ref 74–99)
HCT VFR BLD AUTO: 40.4 % (ref 36–46)
HGB BLD-MCNC: 13.3 G/DL (ref 12–16)
MCH RBC QN AUTO: 30 PG (ref 26–34)
MCHC RBC AUTO-ENTMCNC: 32.9 G/DL (ref 32–36)
MCV RBC AUTO: 91 FL (ref 80–100)
NRBC BLD-RTO: 0 /100 WBCS (ref 0–0)
PLATELET # BLD AUTO: 226 X10*3/UL (ref 150–450)
POTASSIUM SERPL-SCNC: 3.7 MMOL/L (ref 3.5–5.3)
RBC # BLD AUTO: 4.43 X10*6/UL (ref 4–5.2)
SODIUM SERPL-SCNC: 141 MMOL/L (ref 136–145)
WBC # BLD AUTO: 6.5 X10*3/UL (ref 4.4–11.3)

## 2024-12-04 PROCEDURE — 82374 ASSAY BLOOD CARBON DIOXIDE: CPT

## 2024-12-04 PROCEDURE — 99204 OFFICE O/P NEW MOD 45 MIN: CPT | Performed by: NURSE PRACTITIONER

## 2024-12-04 PROCEDURE — 36415 COLL VENOUS BLD VENIPUNCTURE: CPT

## 2024-12-04 PROCEDURE — 85027 COMPLETE CBC AUTOMATED: CPT

## 2024-12-04 PROCEDURE — 80048 BASIC METABOLIC PNL TOTAL CA: CPT

## 2024-12-04 ASSESSMENT — DUKE ACTIVITY SCORE INDEX (DASI)
CAN YOU DO LIGHT WORK AROUND THE HOUSE LIKE DUSTING OR WASHING DISHES: YES
CAN YOU PARTICIPATE IN STRENOUS SPORTS LIKE SWIMMING, SINGLES TENNIS, FOOTBALL, BASKETBALL, OR SKIING: NO
TOTAL_SCORE: 50.7
CAN YOU WALK INDOORS, SUCH AS AROUND YOUR HOUSE: YES
DASI METS SCORE: 9
CAN YOU WALK A BLOCK OR TWO ON LEVEL GROUND: YES
CAN YOU DO YARD WORK LIKE RAKING LEAVES, WEEDING OR PUSHING A MOWER: YES
CAN YOU HAVE SEXUAL RELATIONS: YES
CAN YOU DO HEAVY WORK AROUND THE HOUSE LIKE SCRUBBING FLOORS OR LIFTING AND MOVING HEAVY FURNITURE: YES
CAN YOU TAKE CARE OF YOURSELF (EAT, DRESS, BATHE, OR USE TOILET): YES
CAN YOU PARTICIPATE IN MODERATE RECREATIONAL ACTIVITIES LIKE GOLF, BOWLING, DANCING, DOUBLES TENNIS OR THROWING A BASEBALL OR FOOTBALL: YES
CAN YOU DO MODERATE WORK AROUND THE HOUSE LIKE VACUUMING, SWEEPING FLOORS OR CARRYING GROCERIES: YES
CAN YOU CLIMB A FLIGHT OF STAIRS OR WALK UP A HILL: YES
CAN YOU RUN A SHORT DISTANCE: YES

## 2024-12-04 ASSESSMENT — PAIN SCALES - GENERAL: PAINLEVEL_OUTOF10: 9

## 2024-12-04 ASSESSMENT — PAIN DESCRIPTION - DESCRIPTORS: DESCRIPTORS: ACHING

## 2024-12-04 ASSESSMENT — PAIN - FUNCTIONAL ASSESSMENT: PAIN_FUNCTIONAL_ASSESSMENT: 0-10

## 2024-12-04 NOTE — H&P (VIEW-ONLY)
"CPM/PAT Evaluation       Name: Luther Ulloa (Luther Ulloa)  /Age: 1965/59 y.o.     In-Person       Chief Complaint:     59 yr old female presents to [facility] for pre-operative evaluation, with c/o carpal tunnel syndrome  LEFT CARPAL TUNNEL RELEASE  is  Scheduled on 12/10/2024 with Dr. Hernandez     **RIGHT CARPAL TUNNEL RELEASE  is  Scheduled on 2024 with Dr. Hernandez     The patient has the following past medical history:  Pulmonary nodule, ovarian cyst, hypokalemia, thyroid nodule, AUB      Chief complaint:  She c/o bilateral hand/wrist pain for the last 3-4 years  \"They are on fire at night\"--->sates she gets up jumps and shakes her hands and puts under water for relief  She c/o pain with N/T to both hands, medial aspect of both wrists with radiation of pain and N/T up to the elbow  Symptoms are worse with sleep or driving and occur all day when her wrists are in flexed position  She c/o increased weakness, \"dropping stuff all the time\"    She has tried wrist braces, warm/cold compresses, shakes  She will take Ibuprofen for pain, last taken 2 weeks    She is right hand dominant    PCP- Dr. Jarvis, GYN---  Or will go to Family Medicine    Denies fever, chills or nausea.   Denies any past issues with anesthesia.        There were no vitals filed for this visit.       Past Medical History:   Diagnosis Date    Benign neoplasm of unspecified breast     Breast fibroadenoma    Endometriosis     found at time of hyst; involved small bowel    Personal history of other specified conditions     History of fibrocystic disease of breast       Past Surgical History:   Procedure Laterality Date    BREAST SURGERY  2015    Breast Surgery    HYSTERECTOMY  2017    total Hysterectomy    INCISIONAL BREAST BIOPSY  2015    Incisional Breast Biopsy    OTHER SURGICAL HISTORY  2015    Surgical Excision Of Ectopic Pregnancy Unspecified Location    OTHER SURGICAL HISTORY  2020    Complete colonoscopy "    SALPINGOOPHORECTOMY Left 2015    @ time of hysterectomy    SMALL INTESTINE SURGERY  2015    at time of hyst, found endometriosis    US GUIDED THYROID BIOPSY  01/30/2020    US GUIDED THYROID BIOPSY 1/30/2020 [facility] AIB LEGACY       Patient  has no history on file for sexual activity.    Family History   Problem Relation Name Age of Onset    Lung cancer Mother      Uterine cancer Mother      Brain cancer Mother      Amblyopia Father      Diabetes Father      Multiple myeloma Sister         Allergies   Allergen Reactions    Bee Venom Protein (Honey Bee) Swelling    House Dust Cough    Pollen Extracts Cough       Prior to Admission medications    Medication Sig Start Date End Date Taking? Authorizing Provider   famotidine (Pepcid) 20 mg tablet Take 1 tablet (20 mg) by mouth once daily.  Patient not taking: Reported on 1/31/2024 1/16/24 2/15/24  Yoana Warner MD   metoclopramide (Reglan) 10 mg tablet Take 1 tablet (10 mg) by mouth every 8 hours if needed (Nausea/Vomiting).  Patient not taking: Reported on 1/31/2024 1/16/24   Yoana Warner MD   multivitamin with minerals tablet Take 1 tablet by mouth once daily.    Historical Provider, MD   ondansetron ODT (Zofran-ODT) 4 mg disintegrating tablet Take 1 tablet (4 mg) by mouth every 8 hours if needed for nausea or vomiting.  Patient not taking: Reported on 1/31/2024 1/16/24   Yoana Warner MD   pantoprazole (ProtoNix) 20 mg EC tablet Take 1 tablet (20 mg) by mouth once daily in the morning. Take before meals. Do not crush, chew, or split.  Patient not taking: Reported on 1/31/2024 1/16/24 1/15/25  Yoana Warner MD   predniSONE (Deltasone) 10 mg tablet Take 1 tablet (10 mg) by mouth 2 times a day.  Patient not taking: Reported on 1/31/2024 1/19/24   Chico Nichols, APRN-CNP   UNABLE TO FIND Take 1 Dose by mouth once daily. Med Name: Collagen Pwd or Capsule    Historical Provider, MD        Review of Systems  Constitutional: NO F, chills, or sweats  Eyes: no blurred vision  "or visual disturbance  ENT: + sinuses \"acting up and tx with OTC medication, congestion, sore throat, difficulty hearing  Cardiovascular: no chest pain, no edema, no palps and no syncope.   Respiratory: no cough,no s.o.b. and no wheezing  Gastrointestinal: no abdominal pain, no N/V, no blood in stools  Genitourinary: no dysuria, no urinary frequency, no urinary hesitancy and no feelings of urinary urgency.   Musculoskeletal: no arthralgias,  no back pain and no myalgias.   Integumentary: right hand \"rash, old scar right ring finger scar from \"breaking out with rash,\" no new skin lesions and no rashes.   Neurological: see HPI, no difficulty walking, no headache  Endocrine: no recent weight gain and no recent weight loss.   Hematologic/Lymphatic: no tendency for easy bruising and no swollen glands.      Physical Exam  Constitutional:       Appearance: Normal appearance.   Cardiovascular:      Rate and Rhythm: Normal rate.      Heart sounds: Normal heart sounds.   Pulmonary:      Effort: Pulmonary effort is normal.      Breath sounds: Normal breath sounds.   Abdominal:      General: Bowel sounds are normal.      Palpations: Abdomen is soft.   Musculoskeletal:         General: Normal range of motion.      Cervical back: Normal range of motion.      Right lower leg: No edema.      Left lower leg: No edema.   Skin:     General: Skin is warm and dry.          Neurological:      Mental Status: She is alert and oriented to person, place, and time.          PAT AIRWAY:   Airway:     Mallampati::  II    TM distance::  <3 FB    Neck ROM::  Full  normal        There were no vitals taken for this visit.    DASI Risk Score    No data to display       Caprini DVT Assessment      Flowsheet Row ED to Hosp-Admission (Discharged) from 1/16/2024 in [facility] 7 with Jorge L Archuleta MD and Yoana Warner MD   DVT Score 2 filed at 01/16/2024 1828   BMI 30 or less filed at 01/16/2024 1828   RETIRED: Age 40-59 years filed at " 01/16/2024 1828          Modified Frailty Index    No data to display       CHADS2 Stroke Risk  Current as of 9 minutes ago        N/A 3 to 100%: High Risk   2 to < 3%: Medium Risk   0 to < 2%: Low Risk     Last Change: N/A          This score determines the patient's risk of having a stroke if the patient has atrial fibrillation.        This score is not applicable to this patient. Components are not calculated.          Revised Cardiac Risk Index    No data to display       Apfel Simplified Score    No data to display       Risk Analysis Index Results This Encounter    No data found in the last 10 encounters.       Prodigy: High Risk  Total Score: 0          ARISCAT Score for Postoperative Pulmonary Complications    No data to display       Brandt Perioperative Risk for Myocardial Infarction or Cardiac Arrest (ARTEMIO)    No data to display       ASSESSMENT    Pulmonary nodule/ h/o pneumonia  CXR 1/15/2024-   IMPRESSION:  1. Mild perihilar and interstitial prominence with scattered hazy  opacities which may represent mild edema or atypical infectious  process.    Thyroid nodule  FNA done- negative 2017    Menorrhagia  S/p hysterectomy 2015      ANESTHESIA FINDINGS:  Intubation History: No history of difficult intubation  Significant Anesthesia Considerations:      Airway History: No abnormal airway history    CONSULTS:    Patient does not require consults for optimization at this time.     The Following Tests/Procedures Have Been Initiated and Reviewed/ interpreted by me:   CBC, BMP     Planned Anesthetic: MAC     Instructions Given to Patient:  *Reviewed Medications to be taken in AM of surgery or held  Patient given verbal and written preop instructions and voices comprehension and compliance.     No further testing required.      PLAN  This patient is optimally prepared for surgery.

## 2024-12-04 NOTE — PREPROCEDURE INSTRUCTIONS
Medication List            Accurate as of December 4, 2024  2:48 PM. Always use your most recent med list.                famotidine 20 mg tablet  Commonly known as: Pepcid  Take 1 tablet (20 mg) by mouth once daily.  Additional Medication Adjustments for Surgery: Other (Comment)  Notes to patient: Not taking     metoclopramide 10 mg tablet  Commonly known as: Reglan  Take 1 tablet (10 mg) by mouth every 8 hours if needed (Nausea/Vomiting).  Additional Medication Adjustments for Surgery: Other (Comment)  Notes to patient: Not taking     multivitamin with minerals tablet  Additional Medication Adjustments for Surgery: Take last dose 7 days before surgery  Notes to patient: Stop now     ondansetron ODT 4 mg disintegrating tablet  Commonly known as: Zofran-ODT  Take 1 tablet (4 mg) by mouth every 8 hours if needed for nausea or vomiting.  Additional Medication Adjustments for Surgery: Other (Comment)  Notes to patient: Not taking     pantoprazole 20 mg EC tablet  Commonly known as: ProtoNix  Take 1 tablet (20 mg) by mouth once daily in the morning. Take before meals. Do not crush, chew, or split.  Additional Medication Adjustments for Surgery: Other (Comment)  Notes to patient: Not taking     predniSONE 10 mg tablet  Commonly known as: Deltasone  Take 1 tablet (10 mg) by mouth 2 times a day.  Additional Medication Adjustments for Surgery: Other (Comment)  Notes to patient: Not taking     UNABLE TO FIND  Additional Medication Adjustments for Surgery: Other (Comment)  Notes to patient: Not taking                              NPO Instructions:    Do not eat any food after midnight the night before your surgery/procedure.    Additional Instructions:     Day of Surgery:  You may have clear liquids until TWO hours before surgery/procedure.  This includes water, black tea/coffee, (no milk or cream) apple juice and electrolyte drinks (Gatorade)  Wear  comfortable loose fitting clothing  Do not use moisturizers, creams,  lotions or perfume  All jewelry and valuables should be left at home      PRE-OPERATIVE INSTRUCTIONS FOR SURGERY    *Do not eat anything after midnight the night of surgery.  This includes food of any kind (including hard candy, cough drops, mints).   You may have up to 13 ounces of clear liquid  until TWO hours prior to your scheduled surgery time, unless ERAS* protocol is ordered for you.  Clear liquids include water, black tea/coffee, (no milk or cream) apple juice and electrolyte drinks (GATORADE).  You may chew gum until TWO hours prior you your surgery/procedure.     *ERAS protocol: follow as instructed.  DO not drink an additional 13 ounces as noted above.        *One of our staff members will call you ONE business day before your surgery, between 11am-2 pm to let you know the time to arrive.  If you have not received a call by 2 pm, call [phone]  *When you arrive at the hospital-->GO TO Registration on the ground floor  *Stop smoking 24 hours prior to surgery.  No Marijuana, CBD Oil or Vaping for 48 hours  *No alcohol 24 hours prior to surgery  *You will need a responsible adult to drive you home  -No acrylic nails or nail polish on at least one fingernail, NO polish on toes for foot surgery  -You may be asked to remove your dentures, partial plate, eyeglasses or contact lenses before going to surgery.  Please bring a case for these items.  -Body piercings need to be removed.  Jewelry and valuables should be left at home.  -Put on loose,  comfortable, clean clothing, that will accommodate bandages

## 2024-12-04 NOTE — CPM/PAT H&P
"CPM/PAT Evaluation       Name: Luther Ulloa (Luther Ulola)  /Age: 1965/59 y.o.     In-Person       Chief Complaint:     59 yr old female presents to [facility] for pre-operative evaluation, with c/o carpal tunnel syndrome  LEFT CARPAL TUNNEL RELEASE  is  Scheduled on 12/10/2024 with Dr. Hernandez     **RIGHT CARPAL TUNNEL RELEASE  is  Scheduled on 2024 with Dr. Hernandez     The patient has the following past medical history:  Pulmonary nodule, ovarian cyst, hypokalemia, thyroid nodule, AUB      Chief complaint:  She c/o bilateral hand/wrist pain for the last 3-4 years  \"They are on fire at night\"--->sates she gets up jumps and shakes her hands and puts under water for relief  She c/o pain with N/T to both hands, medial aspect of both wrists with radiation of pain and N/T up to the elbow  Symptoms are worse with sleep or driving and occur all day when her wrists are in flexed position  She c/o increased weakness, \"dropping stuff all the time\"    She has tried wrist braces, warm/cold compresses, shakes  She will take Ibuprofen for pain, last taken 2 weeks    She is right hand dominant    PCP- Dr. Jarvis, GYN---  Or will go to Family Medicine    Denies fever, chills or nausea.   Denies any past issues with anesthesia.        There were no vitals filed for this visit.       Past Medical History:   Diagnosis Date    Benign neoplasm of unspecified breast     Breast fibroadenoma    Endometriosis     found at time of hyst; involved small bowel    Personal history of other specified conditions     History of fibrocystic disease of breast       Past Surgical History:   Procedure Laterality Date    BREAST SURGERY  2015    Breast Surgery    HYSTERECTOMY  2017    total Hysterectomy    INCISIONAL BREAST BIOPSY  2015    Incisional Breast Biopsy    OTHER SURGICAL HISTORY  2015    Surgical Excision Of Ectopic Pregnancy Unspecified Location    OTHER SURGICAL HISTORY  2020    Complete colonoscopy "    SALPINGOOPHORECTOMY Left 2015    @ time of hysterectomy    SMALL INTESTINE SURGERY  2015    at time of hyst, found endometriosis    US GUIDED THYROID BIOPSY  01/30/2020    US GUIDED THYROID BIOPSY 1/30/2020 [facility] [place] AIB LEGAC[facility]       Patient  has no history on file for sexual activity.    Family History   Problem Relation Name Age of Onset    Lung cancer Mother      Uterine cancer Mother      Brain cancer Mother      Amblyopia Father      Diabetes Father      Multiple myeloma Sister         Allergies   Allergen Reactions    Bee Venom Protein (Honey Bee) Swelling    House Dust Cough    Pollen Extracts Cough       Prior to Admission medications    Medication Sig Start Date End Date Taking? Authorizing Provider   famotidine (Pepcid) 20 mg tablet Take 1 tablet (20 mg) by mouth once daily.  Patient not taking: Reported on 1/31/2024 1/16/24 2/15/24  Yoana Warner MD   metoclopramide (Reglan) 10 mg tablet Take 1 tablet (10 mg) by mouth every 8 hours if needed (Nausea/Vomiting).  Patient not taking: Reported on 1/31/2024 1/16/24   Yoana Warner MD   multivitamin with minerals tablet Take 1 tablet by mouth once daily.    Historical Provider, MD   ondansetron ODT (Zofran-ODT) 4 mg disintegrating tablet Take 1 tablet (4 mg) by mouth every 8 hours if needed for nausea or vomiting.  Patient not taking: Reported on 1/31/2024 1/16/24   Yoana Warner MD   pantoprazole (ProtoNix) 20 mg EC tablet Take 1 tablet (20 mg) by mouth once daily in the morning. Take before meals. Do not crush, chew, or split.  Patient not taking: Reported on 1/31/2024 1/16/24 1/15/25  Yoana Warner MD   predniSONE (Deltasone) 10 mg tablet Take 1 tablet (10 mg) by mouth 2 times a day.  Patient not taking: Reported on 1/31/2024 1/19/24   Chico Nichols, APRN-CNP   UNABLE TO FIND Take 1 Dose by mouth once daily. Med Name: Collagen Pwd or Capsule    Historical Provider, MD        Review of Systems  Constitutional: NO F, chills, or sweats  Eyes: no blurred vision  "or visual disturbance  ENT: + sinuses \"acting up and tx with OTC medication, congestion, sore throat, difficulty hearing  Cardiovascular: no chest pain, no edema, no palps and no syncope.   Respiratory: no cough,no s.o.b. and no wheezing  Gastrointestinal: no abdominal pain, no N/V, no blood in stools  Genitourinary: no dysuria, no urinary frequency, no urinary hesitancy and no feelings of urinary urgency.   Musculoskeletal: no arthralgias,  no back pain and no myalgias.   Integumentary: right hand \"rash, old scar right ring finger scar from \"breaking out with rash,\" no new skin lesions and no rashes.   Neurological: see HPI, no difficulty walking, no headache  Endocrine: no recent weight gain and no recent weight loss.   Hematologic/Lymphatic: no tendency for easy bruising and no swollen glands.      Physical Exam  Constitutional:       Appearance: Normal appearance.   Cardiovascular:      Rate and Rhythm: Normal rate.      Heart sounds: Normal heart sounds.   Pulmonary:      Effort: Pulmonary effort is normal.      Breath sounds: Normal breath sounds.   Abdominal:      General: Bowel sounds are normal.      Palpations: Abdomen is soft.   Musculoskeletal:         General: Normal range of motion.      Cervical back: Normal range of motion.      Right lower leg: No edema.      Left lower leg: No edema.   Skin:     General: Skin is warm and dry.          Neurological:      Mental Status: She is alert and oriented to person, place, and time.          PAT AIRWAY:   Airway:     Mallampati::  II    TM distance::  <3 FB    Neck ROM::  Full  normal        There were no vitals taken for this visit.    DASI Risk Score    No data to display       Caprini DVT Assessment      Flowsheet Row ED to Hosp-Admission (Discharged) from 1/16/2024 in [facility] 7 with Jorge L Archuleta MD and Yoana Warner MD   DVT Score 2 filed at 01/16/2024 1828   BMI 30 or less filed at 01/16/2024 1828   RETIRED: Age 40-59 years filed at " 01/16/2024 1828          Modified Frailty Index    No data to display       CHADS2 Stroke Risk  Current as of 9 minutes ago        N/A 3 to 100%: High Risk   2 to < 3%: Medium Risk   0 to < 2%: Low Risk     Last Change: N/A          This score determines the patient's risk of having a stroke if the patient has atrial fibrillation.        This score is not applicable to this patient. Components are not calculated.          Revised Cardiac Risk Index    No data to display       Apfel Simplified Score    No data to display       Risk Analysis Index Results This Encounter    No data found in the last 10 encounters.       Prodigy: High Risk  Total Score: 0          ARISCAT Score for Postoperative Pulmonary Complications    No data to display       Brandt Perioperative Risk for Myocardial Infarction or Cardiac Arrest (ARTEMIO)    No data to display       ASSESSMENT    Pulmonary nodule/ h/o pneumonia  CXR 1/15/2024-   IMPRESSION:  1. Mild perihilar and interstitial prominence with scattered hazy  opacities which may represent mild edema or atypical infectious  process.    Thyroid nodule  FNA done- negative 2017    Menorrhagia  S/p hysterectomy 2015      ANESTHESIA FINDINGS:  Intubation History: No history of difficult intubation  Significant Anesthesia Considerations:      Airway History: No abnormal airway history    CONSULTS:    Patient does not require consults for optimization at this time.     The Following Tests/Procedures Have Been Initiated and Reviewed/ interpreted by me:   CBC, BMP     Planned Anesthetic: MAC     Instructions Given to Patient:  *Reviewed Medications to be taken in AM of surgery or held  Patient given verbal and written preop instructions and voices comprehension and compliance.     No further testing required.      PLAN  This patient is optimally prepared for surgery.

## 2024-12-04 NOTE — H&P (VIEW-ONLY)
"CPM/PAT Evaluation       Name: Luther Ulloa (Luther Ulloa)  /Age: 1965/59 y.o.     In-Person       Chief Complaint:     59 yr old female presents to [facility] for pre-operative evaluation, with c/o carpal tunnel syndrome  LEFT CARPAL TUNNEL RELEASE  is  Scheduled on 12/10/2024 with Dr. Hernandez     **RIGHT CARPAL TUNNEL RELEASE  is  Scheduled on 2024 with Dr. Hernandez     The patient has the following past medical history:  Pulmonary nodule, ovarian cyst, hypokalemia, thyroid nodule, AUB      Chief complaint:  She c/o bilateral hand/wrist pain for the last 3-4 years  \"They are on fire at night\"--->sates she gets up jumps and shakes her hands and puts under water for relief  She c/o pain with N/T to both hands, medial aspect of both wrists with radiation of pain and N/T up to the elbow  Symptoms are worse with sleep or driving and occur all day when her wrists are in flexed position  She c/o increased weakness, \"dropping stuff all the time\"    She has tried wrist braces, warm/cold compresses, shakes  She will take Ibuprofen for pain, last taken 2 weeks    She is right hand dominant    PCP- Dr. Jarvis, GYN---  Or will go to Family Medicine    Denies fever, chills or nausea.   Denies any past issues with anesthesia.        There were no vitals filed for this visit.       Past Medical History:   Diagnosis Date    Benign neoplasm of unspecified breast     Breast fibroadenoma    Endometriosis     found at time of hyst; involved small bowel    Personal history of other specified conditions     History of fibrocystic disease of breast       Past Surgical History:   Procedure Laterality Date    BREAST SURGERY  2015    Breast Surgery    HYSTERECTOMY  2017    total Hysterectomy    INCISIONAL BREAST BIOPSY  2015    Incisional Breast Biopsy    OTHER SURGICAL HISTORY  2015    Surgical Excision Of Ectopic Pregnancy Unspecified Location    OTHER SURGICAL HISTORY  2020    Complete colonoscopy "    SALPINGOOPHORECTOMY Left 2015    @ time of hysterectomy    SMALL INTESTINE SURGERY  2015    at time of hyst, found endometriosis    US GUIDED THYROID BIOPSY  01/30/2020    US GUIDED THYROID BIOPSY 1/30/2020 [facility]       Patient  has no history on file for sexual activity.    Family History   Problem Relation Name Age of Onset    Lung cancer Mother      Uterine cancer Mother      Brain cancer Mother      Amblyopia Father      Diabetes Father      Multiple myeloma Sister         Allergies   Allergen Reactions    Bee Venom Protein (Honey Bee) Swelling    House Dust Cough    Pollen Extracts Cough       Prior to Admission medications    Medication Sig Start Date End Date Taking? Authorizing Provider   famotidine (Pepcid) 20 mg tablet Take 1 tablet (20 mg) by mouth once daily.  Patient not taking: Reported on 1/31/2024 1/16/24 2/15/24  Yoana Warner MD   metoclopramide (Reglan) 10 mg tablet Take 1 tablet (10 mg) by mouth every 8 hours if needed (Nausea/Vomiting).  Patient not taking: Reported on 1/31/2024 1/16/24   Yoana Warner MD   multivitamin with minerals tablet Take 1 tablet by mouth once daily.    Historical Provider, MD   ondansetron ODT (Zofran-ODT) 4 mg disintegrating tablet Take 1 tablet (4 mg) by mouth every 8 hours if needed for nausea or vomiting.  Patient not taking: Reported on 1/31/2024 1/16/24   Yoana Warner MD   pantoprazole (ProtoNix) 20 mg EC tablet Take 1 tablet (20 mg) by mouth once daily in the morning. Take before meals. Do not crush, chew, or split.  Patient not taking: Reported on 1/31/2024 1/16/24 1/15/25  Yoana Warner MD   predniSONE (Deltasone) 10 mg tablet Take 1 tablet (10 mg) by mouth 2 times a day.  Patient not taking: Reported on 1/31/2024 1/19/24   Chico Nichols, APRN-CNP   UNABLE TO FIND Take 1 Dose by mouth once daily. Med Name: Collagen Pwd or Capsule    Historical Provider, MD        Review of Systems  Constitutional: NO F, chills, or sweats  Eyes: no blurred vision  "or visual disturbance  ENT: + sinuses \"acting up and tx with OTC medication, congestion, sore throat, difficulty hearing  Cardiovascular: no chest pain, no edema, no palps and no syncope.   Respiratory: no cough,no s.o.b. and no wheezing  Gastrointestinal: no abdominal pain, no N/V, no blood in stools  Genitourinary: no dysuria, no urinary frequency, no urinary hesitancy and no feelings of urinary urgency.   Musculoskeletal: no arthralgias,  no back pain and no myalgias.   Integumentary: right hand \"rash, old scar right ring finger scar from \"breaking out with rash,\" no new skin lesions and no rashes.   Neurological: see HPI, no difficulty walking, no headache  Endocrine: no recent weight gain and no recent weight loss.   Hematologic/Lymphatic: no tendency for easy bruising and no swollen glands.      Physical Exam  Constitutional:       Appearance: Normal appearance.   Cardiovascular:      Rate and Rhythm: Normal rate.      Heart sounds: Normal heart sounds.   Pulmonary:      Effort: Pulmonary effort is normal.      Breath sounds: Normal breath sounds.   Abdominal:      General: Bowel sounds are normal.      Palpations: Abdomen is soft.   Musculoskeletal:         General: Normal range of motion.      Cervical back: Normal range of motion.      Right lower leg: No edema.      Left lower leg: No edema.   Skin:     General: Skin is warm and dry.          Neurological:      Mental Status: She is alert and oriented to person, place, and time.          PAT AIRWAY:   Airway:     Mallampati::  II    TM distance::  <3 FB    Neck ROM::  Full  normal        There were no vitals taken for this visit.    DASI Risk Score    No data to display       Caprini DVT Assessment      Flowsheet Row ED to Hosp-Admission (Discharged) from 1/16/2024 in [facility] 7 with Jorge L Archuleta MD and Yoana Warner MD   DVT Score 2 filed at 01/16/2024 1828   BMI 30 or less filed at 01/16/2024 1828   RETIRED: Age 40-59 years filed at " 01/16/2024 1828          Modified Frailty Index    No data to display       CHADS2 Stroke Risk  Current as of 9 minutes ago        N/A 3 to 100%: High Risk   2 to < 3%: Medium Risk   0 to < 2%: Low Risk     Last Change: N/A          This score determines the patient's risk of having a stroke if the patient has atrial fibrillation.        This score is not applicable to this patient. Components are not calculated.          Revised Cardiac Risk Index    No data to display       Apfel Simplified Score    No data to display       Risk Analysis Index Results This Encounter    No data found in the last 10 encounters.       Prodigy: High Risk  Total Score: 0          ARISCAT Score for Postoperative Pulmonary Complications    No data to display       Brandt Perioperative Risk for Myocardial Infarction or Cardiac Arrest (ARTEMIO)    No data to display       ASSESSMENT    Pulmonary nodule/ h/o pneumonia  CXR 1/15/2024-   IMPRESSION:  1. Mild perihilar and interstitial prominence with scattered hazy  opacities which may represent mild edema or atypical infectious  process.    Thyroid nodule  FNA done- negative 2017    Menorrhagia  S/p hysterectomy 2015      ANESTHESIA FINDINGS:  Intubation History: No history of difficult intubation  Significant Anesthesia Considerations:      Airway History: No abnormal airway history    CONSULTS:    Patient does not require consults for optimization at this time.     The Following Tests/Procedures Have Been Initiated and Reviewed/ interpreted by me:   CBC, BMP     Planned Anesthetic: MAC     Instructions Given to Patient:  *Reviewed Medications to be taken in AM of surgery or held  Patient given verbal and written preop instructions and voices comprehension and compliance.     No further testing required.      PLAN  This patient is optimally prepared for surgery.

## 2024-12-10 ENCOUNTER — HOSPITAL ENCOUNTER (OUTPATIENT)
Facility: HOSPITAL | Age: 59
Setting detail: OUTPATIENT SURGERY
Discharge: HOME | End: 2024-12-10
Attending: ORTHOPAEDIC SURGERY | Admitting: ORTHOPAEDIC SURGERY
Payer: COMMERCIAL

## 2024-12-10 ENCOUNTER — ANESTHESIA EVENT (OUTPATIENT)
Dept: OPERATING ROOM | Facility: HOSPITAL | Age: 59
End: 2024-12-10
Payer: COMMERCIAL

## 2024-12-10 ENCOUNTER — ANESTHESIA (OUTPATIENT)
Dept: OPERATING ROOM | Facility: HOSPITAL | Age: 59
End: 2024-12-10
Payer: COMMERCIAL

## 2024-12-10 VITALS
BODY MASS INDEX: 23.53 KG/M2 | TEMPERATURE: 97.5 F | HEART RATE: 59 BPM | OXYGEN SATURATION: 100 % | DIASTOLIC BLOOD PRESSURE: 75 MMHG | HEIGHT: 62 IN | RESPIRATION RATE: 16 BRPM | WEIGHT: 127.87 LBS | SYSTOLIC BLOOD PRESSURE: 142 MMHG

## 2024-12-10 DIAGNOSIS — G56.02 CARPAL TUNNEL SYNDROME ON LEFT: Primary | ICD-10-CM

## 2024-12-10 PROCEDURE — 3600000008 HC OR TIME - EACH INCREMENTAL 1 MINUTE - PROCEDURE LEVEL THREE: Performed by: ORTHOPAEDIC SURGERY

## 2024-12-10 PROCEDURE — 3700000002 HC GENERAL ANESTHESIA TIME - EACH INCREMENTAL 1 MINUTE: Performed by: ORTHOPAEDIC SURGERY

## 2024-12-10 PROCEDURE — 3600000003 HC OR TIME - INITIAL BASE CHARGE - PROCEDURE LEVEL THREE: Performed by: ORTHOPAEDIC SURGERY

## 2024-12-10 PROCEDURE — A64721 PR REVISE MEDIAN N/CARPAL TUNNEL SURG: Performed by: ANESTHESIOLOGY

## 2024-12-10 PROCEDURE — 2500000004 HC RX 250 GENERAL PHARMACY W/ HCPCS (ALT 636 FOR OP/ED): Performed by: ORTHOPAEDIC SURGERY

## 2024-12-10 PROCEDURE — 2500000004 HC RX 250 GENERAL PHARMACY W/ HCPCS (ALT 636 FOR OP/ED): Performed by: NURSE ANESTHETIST, CERTIFIED REGISTERED

## 2024-12-10 PROCEDURE — 7100000010 HC PHASE TWO TIME - EACH INCREMENTAL 1 MINUTE: Performed by: ORTHOPAEDIC SURGERY

## 2024-12-10 PROCEDURE — 7100000009 HC PHASE TWO TIME - INITIAL BASE CHARGE: Performed by: ORTHOPAEDIC SURGERY

## 2024-12-10 PROCEDURE — 3700000001 HC GENERAL ANESTHESIA TIME - INITIAL BASE CHARGE: Performed by: ORTHOPAEDIC SURGERY

## 2024-12-10 PROCEDURE — 64721 CARPAL TUNNEL SURGERY: CPT | Performed by: ORTHOPAEDIC SURGERY

## 2024-12-10 PROCEDURE — A64721 PR REVISE MEDIAN N/CARPAL TUNNEL SURG: Performed by: NURSE ANESTHETIST, CERTIFIED REGISTERED

## 2024-12-10 PROCEDURE — 2720000007 HC OR 272 NO HCPCS: Performed by: ORTHOPAEDIC SURGERY

## 2024-12-10 RX ORDER — FENTANYL CITRATE 50 UG/ML
INJECTION, SOLUTION INTRAMUSCULAR; INTRAVENOUS AS NEEDED
Status: DISCONTINUED | OUTPATIENT
Start: 2024-12-10 | End: 2024-12-10

## 2024-12-10 RX ORDER — SODIUM CHLORIDE, SODIUM LACTATE, POTASSIUM CHLORIDE, CALCIUM CHLORIDE 600; 310; 30; 20 MG/100ML; MG/100ML; MG/100ML; MG/100ML
100 INJECTION, SOLUTION INTRAVENOUS CONTINUOUS
Status: DISCONTINUED | OUTPATIENT
Start: 2024-12-10 | End: 2024-12-10 | Stop reason: HOSPADM

## 2024-12-10 RX ORDER — BUPIVACAINE HYDROCHLORIDE 5 MG/ML
INJECTION, SOLUTION PERINEURAL AS NEEDED
Status: DISCONTINUED | OUTPATIENT
Start: 2024-12-10 | End: 2024-12-10 | Stop reason: HOSPADM

## 2024-12-10 RX ORDER — LABETALOL HYDROCHLORIDE 5 MG/ML
5 INJECTION, SOLUTION INTRAVENOUS ONCE AS NEEDED
Status: DISCONTINUED | OUTPATIENT
Start: 2024-12-10 | End: 2024-12-10 | Stop reason: HOSPADM

## 2024-12-10 RX ORDER — ALBUTEROL SULFATE 0.83 MG/ML
2.5 SOLUTION RESPIRATORY (INHALATION) ONCE AS NEEDED
Status: DISCONTINUED | OUTPATIENT
Start: 2024-12-10 | End: 2024-12-10 | Stop reason: HOSPADM

## 2024-12-10 RX ORDER — ONDANSETRON HYDROCHLORIDE 2 MG/ML
4 INJECTION, SOLUTION INTRAVENOUS ONCE AS NEEDED
Status: DISCONTINUED | OUTPATIENT
Start: 2024-12-10 | End: 2024-12-10 | Stop reason: HOSPADM

## 2024-12-10 RX ORDER — MEPERIDINE HYDROCHLORIDE 25 MG/ML
12.5 INJECTION INTRAMUSCULAR; INTRAVENOUS; SUBCUTANEOUS EVERY 10 MIN PRN
Status: DISCONTINUED | OUTPATIENT
Start: 2024-12-10 | End: 2024-12-10 | Stop reason: HOSPADM

## 2024-12-10 RX ORDER — MIDAZOLAM HYDROCHLORIDE 1 MG/ML
INJECTION, SOLUTION INTRAMUSCULAR; INTRAVENOUS AS NEEDED
Status: DISCONTINUED | OUTPATIENT
Start: 2024-12-10 | End: 2024-12-10

## 2024-12-10 RX ORDER — LIDOCAINE HYDROCHLORIDE 10 MG/ML
0.1 INJECTION, SOLUTION INFILTRATION; PERINEURAL ONCE
Status: DISCONTINUED | OUTPATIENT
Start: 2024-12-10 | End: 2024-12-10 | Stop reason: HOSPADM

## 2024-12-10 RX ORDER — LIDOCAINE HCL/PF 100 MG/5ML
SYRINGE (ML) INTRAVENOUS AS NEEDED
Status: DISCONTINUED | OUTPATIENT
Start: 2024-12-10 | End: 2024-12-10

## 2024-12-10 RX ORDER — MIDAZOLAM HYDROCHLORIDE 1 MG/ML
1 INJECTION, SOLUTION INTRAMUSCULAR; INTRAVENOUS ONCE AS NEEDED
Status: DISCONTINUED | OUTPATIENT
Start: 2024-12-10 | End: 2024-12-10 | Stop reason: HOSPADM

## 2024-12-10 RX ORDER — ACETAMINOPHEN 325 MG/1
650 TABLET ORAL EVERY 4 HOURS PRN
Status: DISCONTINUED | OUTPATIENT
Start: 2024-12-10 | End: 2024-12-10 | Stop reason: HOSPADM

## 2024-12-10 RX ORDER — PROPOFOL 10 MG/ML
INJECTION, EMULSION INTRAVENOUS AS NEEDED
Status: DISCONTINUED | OUTPATIENT
Start: 2024-12-10 | End: 2024-12-10

## 2024-12-10 RX ORDER — ONDANSETRON HYDROCHLORIDE 2 MG/ML
INJECTION, SOLUTION INTRAVENOUS AS NEEDED
Status: DISCONTINUED | OUTPATIENT
Start: 2024-12-10 | End: 2024-12-10

## 2024-12-10 RX ORDER — HYDRALAZINE HYDROCHLORIDE 20 MG/ML
5 INJECTION INTRAMUSCULAR; INTRAVENOUS EVERY 30 MIN PRN
Status: DISCONTINUED | OUTPATIENT
Start: 2024-12-10 | End: 2024-12-10 | Stop reason: HOSPADM

## 2024-12-10 SDOH — HEALTH STABILITY: MENTAL HEALTH: CURRENT SMOKER: 0

## 2024-12-10 ASSESSMENT — PAIN - FUNCTIONAL ASSESSMENT
PAIN_FUNCTIONAL_ASSESSMENT: 0-10

## 2024-12-10 ASSESSMENT — PAIN SCALES - GENERAL
PAIN_LEVEL: 0
PAINLEVEL_OUTOF10: 0 - NO PAIN

## 2024-12-10 NOTE — ANESTHESIA POSTPROCEDURE EVALUATION
Patient: Luther Ulloa    Procedure Summary       Date: 12/10/24 Room / Location: [facility] OR 04 / Virtual [facility] OR    Anesthesia Start: 1129 Anesthesia Stop:     Procedure: LEFT CARPAL TUNNEL RELEASE (Left: Wrist) Diagnosis:       Carpal tunnel syndrome on left      (Carpal tunnel syndrome on left [G56.02])    Surgeons: Ovidio Hernandez MD Responsible Provider: Anthony Bach MD    Anesthesia Type: Not recorded ASA Status: Not recorded            Anesthesia Type: No value filed.    Vitals Value Taken Time   /67 12/10/24 1157   Temp 36.4 °C (97.5 °F) 12/10/24 1156   Pulse 58 12/10/24 1158   Resp 15 12/10/24 1156   SpO2 100 % 12/10/24 1158   Vitals shown include unfiled device data.    Anesthesia Post Evaluation    Patient location during evaluation: PACU  Patient participation: complete - patient participated  Level of consciousness: awake  Pain score: 0  Pain management: adequate  Airway patency: patent  Cardiovascular status: acceptable, blood pressure returned to baseline and hemodynamically stable  Respiratory status: acceptable and nasal cannula  Hydration status: acceptable  Postoperative Nausea and Vomiting: none        There were no known notable events for this encounter.

## 2024-12-10 NOTE — OP NOTE
LEFT CARPAL TUNNEL RELEASE (L) Operative Note     Date: 12/10/2024  OR Location: [facility] OR    Name: Luther Ulloa, : 1965, Age: 59 y.o., MRN: [id number], Sex: female    Diagnosis  Pre-op Diagnosis      * Carpal tunnel syndrome on left [G56.02] Post-op Diagnosis     * Carpal tunnel syndrome on left [G56.02]     Procedures  LEFT CARPAL TUNNEL RELEASE  [id number] - [place] NEUROPLASTY &/TRANSPOS MEDIAN NRV CARPAL TUNNE      Surgeons      * Ovidio Hernandez - Primary    Resident/Fellow/Other Assistant:  Surgeons and Role:  * No surgeons found with a matching role *    Staff:   Circulator: Ynes  Surgical Assistant: Allison Ramirez Person: Kayla  Surgical Assistant: Fabian    Anesthesia Staff: Anesthesiologist: Anthony Bach MD  CRNA: PRAKASH Rust-WINDY    Procedure Summary  Anesthesia: Anesthesia type not filed in the log.  ASA: ASA status not filed in the log.  Estimated Blood Loss: 1 mL  Intra-op Medications:   Administrations occurring from 1135 to 1215 on 12/10/24:   Medication Name Total Dose   BUPivacaine HCl (Marcaine) 0.5 % (5 mg/mL) injection 10 mL   ondansetron (Zofran) 2 mg/mL injection 4 mg   propofol (Diprivan) injection 10 mg/mL 81.2 mg              Anesthesia Record               Intraprocedure I/O Totals       None           Specimen: No specimens collected              Drains and/or Catheters: * None in log *    Tourniquet Times:     Total Tourniquet Time Documented:  Arm - Lower (Left) - 7 minutes  Total: Arm - Lower (Left) - 7 minutes      Implants:     Findings: Thickened transverse carpal ligament    Indications: Luther Ulloa is an 59 y.o. female who is having surgery for Carpal tunnel syndrome on left [G56.02].  The patient presented with left hand pain and numbness consistent with carpal tunnel syndrome.  She had electrodiagnostic tests which were confirmatory of it.  She had tried and failed conservative management.  We then discussed surgical treatment options quitting a left carpal tunnel  release.  I discussed with the patient in detail the risk, benefits alternatives of a left carpal tunnel release.  I explained to her that the risks of the procedure include but are not limited to infection, damage to nerve tendon blood vessels, continued numbness, postoperative pain and stiffness, as well as risks associate with anesthesia.  The patient voiced understanding and informed consent was obtained.    The patient was seen in the preoperative area. The risks, benefits, complications, treatment options, non-operative alternatives, expected recovery and outcomes were discussed with the patient. The possibilities of reaction to medication, pulmonary aspiration, injury to surrounding structures, bleeding, recurrent infection, the need for additional procedures, failure to diagnose a condition, and creating a complication requiring transfusion or operation were discussed with the patient. The patient concurred with the proposed plan, giving informed consent.  The site of surgery was properly noted/marked if necessary per policy. The patient has been actively warmed in preoperative area. Preoperative antibiotics are not indicated. Venous thrombosis prophylaxis are not indicated.    Procedure Details: The patient was properly identified in the preoperative waiting area and her left hand was marked as site of surgery.  The patient was taken back to the operating room suite and placed supine on the OR table.  A nonsterile tourniquet was applied to the patient's left forearm.  After intravenous sedation was administered the patient's left upper extremity was prepped and draped in the usual sterile fashion.  A preoperative verification timeout was taken.  10 mL of half percent plain Marcaine was injected as a local anesthetic near the proposed incision site.  Patient's left upper extremity was then exsanguinated with an Esmarch bandage and the tourniquet inflated to 250 mmHg.  Approximately 1.5 inch longitudinal  incision was made in the base of the palm in line with the third webspace.  Sharp dissection was carried through skin and subcutaneous tissue.  Electrocautery was used to achieve hemostasis.  The palmar fascia was identified and split.  Dissection was carried to the transverse carpal ligament which was then transected with a 15 blade.  Antebrachial fascia was released proximally.  Full decompression of the median nerve was confirmed.  Wound was irrigated with normal saline.  Skin was closed with 4-0 nylon suture.  Tourniquet was let down after 7 minutes.  Good vascular return was seen of the patient's left hand and all digits.  Sterile bandage consisting of Xeroform, gauze 4 x 4's, Webril and Ace wrap was applied to the patient's left hand.  Patient was awakened from anesthesia and returned to recovery room stable condition.  There were no complications or any case.  All sponge and needle counts were correct at the end of the case.  Complications:  None; patient tolerated the procedure well.    Disposition: PACU - hemodynamically stable.  Condition: stable         Task Performed by RNFA or Surgical Assistant:  The surgical assistant was there for retraction, skin closure and application of bandage.          Additional Details: None    Attending Attestation: I performed the procedure.    Ovidio Hernandez  Phone Number: [phone]

## 2024-12-10 NOTE — ANESTHESIA PREPROCEDURE EVALUATION
Patient: Luther Ulloa    Procedure Information       Anesthesia Start Date/Time: 12/10/24 1129    Procedure: LEFT CARPAL TUNNEL RELEASE (Left: Wrist)    Location: [facility] OR 04 / [facility] OR    Surgeons: Ovidio Hernandez MD            Relevant Problems   Anesthesia (within normal limits)      Cardiac   (+) Breast pain      Neuro   (+) Carpal tunnel syndrome of left wrist   (+) Carpal tunnel syndrome of right wrist   (+) Carpal tunnel syndrome on left   (+) Carpal tunnel syndrome on right      GI   (+) Rectal bleeding      Musculoskeletal   (+) Carpal tunnel syndrome of left wrist   (+) Carpal tunnel syndrome of right wrist   (+) Carpal tunnel syndrome on left   (+) Carpal tunnel syndrome on right      GYN   (+) Abnormal uterine bleeding (AUB)   (+) Menorrhagia   (+) Uterine fibroid       Clinical information reviewed:    Allergies  Meds     OB Status           NPO Detail:  NPO/Void Status  Date of Last Liquid: 12/10/24  Time of Last Liquid: 0600  Date of Last Solid: 12/09/24  Time of Last Solid: 1800  Last Intake Type: Clear fluids         Physical Exam    Airway  Mallampati: II  TM distance: >3 FB  Neck ROM: full     Cardiovascular   Rhythm: regular  Rate: normal     Dental - normal exam     Pulmonary    Abdominal        Anesthesia Plan    History of general anesthesia?: yes  History of complications of general anesthesia?: no    ASA 3     MAC     The patient is not a current smoker.  Patient was not previously instructed to abstain from smoking on day of procedure.  Patient did not smoke on day of procedure.    intravenous induction   Anesthetic plan and risks discussed with patient.  Use of blood products discussed with patient who.    Plan discussed with CRNA.

## 2024-12-10 NOTE — BRIEF OP NOTE
Date: 12/10/2024  OR Location: [facility] OR    Name: Luther Ulloa, : 1965, Age: 59 y.o., MRN: [id number], Sex: female    Diagnosis  Pre-op Diagnosis      * Carpal tunnel syndrome on left [G56.02] Post-op Diagnosis     * Carpal tunnel syndrome on left [G56.02]     Procedures  LEFT CARPAL TUNNEL RELEASE  [id number] - [place] NEUROPLASTY &/TRANSPOS MEDIAN NRV CARPAL TUNNE      Surgeons      * Ovidio Hernandez - Primary    Resident/Fellow/Other Assistant:  Surgeons and Role:  * No surgeons found with a matching role *    Staff:   Circulator: Ynes  Surgical Assistant: Allison Ramirez Person: Kayla  Surgical Assistant: Fabian    Anesthesia Staff: Anesthesiologist: Anthony Bach MD  CRNA: PRAKASH Rust-WINDY    Procedure Summary  Anesthesia: Anesthesia type not filed in the log.  ASA: ASA status not filed in the log.  Estimated Blood Loss: 1 mL  Intra-op Medications:   Administrations occurring from 1135 to 1215 on 12/10/24:   Medication Name Total Dose   BUPivacaine HCl (Marcaine) 0.5 % (5 mg/mL) injection 10 mL   ondansetron (Zofran) 2 mg/mL injection 4 mg   propofol (Diprivan) injection 10 mg/mL 81.2 mg              Anesthesia Record               Intraprocedure I/O Totals       None           Specimen: No specimens collected               Findings: Thickened transverse carpal ligament    Complications:  None; patient tolerated the procedure well.     Disposition: PACU - hemodynamically stable.  Condition: stable  Specimens Collected: No specimens collected  Attending Attestation: I performed the procedure.    Ovidio Hernandez  Phone Number: [phone]

## 2024-12-11 ASSESSMENT — PAIN SCALES - GENERAL: PAINLEVEL_OUTOF10: 1

## 2024-12-23 ENCOUNTER — OFFICE VISIT (OUTPATIENT)
Dept: ORTHOPEDIC SURGERY | Facility: CLINIC | Age: 59
End: 2024-12-23
Payer: COMMERCIAL

## 2024-12-23 DIAGNOSIS — G56.02 CARPAL TUNNEL SYNDROME ON LEFT: Primary | ICD-10-CM

## 2024-12-23 PROCEDURE — 99211 OFF/OP EST MAY X REQ PHY/QHP: CPT | Performed by: ORTHOPAEDIC SURGERY

## 2024-12-23 NOTE — PROGRESS NOTES
Subjective    Patient ID: Luther Ulloa is a 59 y.o. female.    Chief Complaint: Postop Visit (LEFT CARPAL TUNNEL RELEASE/DOS 12/10/24)     Last Surgery: Left Carpal Tunnel Release - Left  Last Surgery Date: 12/10/2024    HPI  The patient comes in for her postoperative visit after undergoing a left carpal tunnel release.  She has noticed improvement in her symptoms.  Her pain is also decreasing.    Objective   Ortho Exam  The patient is in no acute distress.  Exam of her left hand reveals the incision is well-healed.  Sutures are removed.  She has full range of motion in her fingers.    Assessment/Plan   Encounter Diagnoses:  Carpal tunnel syndrome on left    Patient is doing satisfactory following her left carpal tunnel release.  She may use her left hand is much as she can tolerate.  She was informed to takes more than 2 weeks for the nerve to heal.  She is scheduled for right carpal tunnel release in 1 week.

## 2024-12-30 NOTE — PREPROCEDURE INSTRUCTIONS
Current Medications   Medication Instructions    multivitamin with minerals tablet Stop 7 days before surgery          NPO Instructions:    Do not eat any food after midnight the night before your surgery/procedure.  You may have 13 ounces of clear liquids until TWO hours before surgery. This includes water, black tea/coffee, (no milk or cream) apple juice and electrolyte drinks (Gatorade).    Additional Instructions:     Day of Surgery: Arrive in registration at 6:00 AM for 7:30 AM surgery    Enter through the main entrance of [facility], located at [address] [facility]. Proceed to registration, located on the right hand side of the staircase. You will need your ID and insurance card for registration. Please ensure you have a responsible adult to drive you home. A responsible adult DOES NOT include rideshare service drivers (Uber, Lyft, etc), cab drivers, or public transportation drivers, but “provide a ride” is acceptable.    Take a shower before your procedure. After your shower avoid lotions, powders, deodorants or anything applied to the skin. If you wear contacts or glasses, wear the glasses. If you do not have glasses, please bring a case for your contacts. You may wear hearing aids and dentures, bring a case for them or we will provide one. Make sure you wear something loose and comfortable. Keep in mind your surgical procedure and wear something that will accommodate incisions or bandages. Please remove all jewelry and piercing's.     For further questions [place] [facility] can be contacted at [phone] between 7AM-3PM.

## 2024-12-31 ENCOUNTER — ANESTHESIA (OUTPATIENT)
Dept: OPERATING ROOM | Facility: HOSPITAL | Age: 59
End: 2024-12-31
Payer: COMMERCIAL

## 2024-12-31 ENCOUNTER — ANESTHESIA EVENT (OUTPATIENT)
Dept: OPERATING ROOM | Facility: HOSPITAL | Age: 59
End: 2024-12-31
Payer: COMMERCIAL

## 2024-12-31 ENCOUNTER — HOSPITAL ENCOUNTER (OUTPATIENT)
Facility: HOSPITAL | Age: 59
Setting detail: OUTPATIENT SURGERY
Discharge: HOME | End: 2024-12-31
Attending: ORTHOPAEDIC SURGERY | Admitting: ORTHOPAEDIC SURGERY
Payer: COMMERCIAL

## 2024-12-31 VITALS
RESPIRATION RATE: 18 BRPM | SYSTOLIC BLOOD PRESSURE: 128 MMHG | BODY MASS INDEX: 23.53 KG/M2 | WEIGHT: 127.87 LBS | HEART RATE: 60 BPM | OXYGEN SATURATION: 100 % | DIASTOLIC BLOOD PRESSURE: 73 MMHG | HEIGHT: 62 IN | TEMPERATURE: 97.3 F

## 2024-12-31 DIAGNOSIS — G56.01 CARPAL TUNNEL SYNDROME ON RIGHT: Primary | ICD-10-CM

## 2024-12-31 PROCEDURE — A64721 PR REVISE MEDIAN N/CARPAL TUNNEL SURG

## 2024-12-31 PROCEDURE — 2500000004 HC RX 250 GENERAL PHARMACY W/ HCPCS (ALT 636 FOR OP/ED)

## 2024-12-31 PROCEDURE — 2500000004 HC RX 250 GENERAL PHARMACY W/ HCPCS (ALT 636 FOR OP/ED): Performed by: ORTHOPAEDIC SURGERY

## 2024-12-31 PROCEDURE — 3600000003 HC OR TIME - INITIAL BASE CHARGE - PROCEDURE LEVEL THREE: Performed by: ORTHOPAEDIC SURGERY

## 2024-12-31 PROCEDURE — 3700000002 HC GENERAL ANESTHESIA TIME - EACH INCREMENTAL 1 MINUTE: Performed by: ORTHOPAEDIC SURGERY

## 2024-12-31 PROCEDURE — 3700000001 HC GENERAL ANESTHESIA TIME - INITIAL BASE CHARGE: Performed by: ORTHOPAEDIC SURGERY

## 2024-12-31 PROCEDURE — 3600000008 HC OR TIME - EACH INCREMENTAL 1 MINUTE - PROCEDURE LEVEL THREE: Performed by: ORTHOPAEDIC SURGERY

## 2024-12-31 PROCEDURE — 7100000009 HC PHASE TWO TIME - INITIAL BASE CHARGE: Performed by: ORTHOPAEDIC SURGERY

## 2024-12-31 PROCEDURE — 64721 CARPAL TUNNEL SURGERY: CPT | Performed by: ORTHOPAEDIC SURGERY

## 2024-12-31 PROCEDURE — 7100000010 HC PHASE TWO TIME - EACH INCREMENTAL 1 MINUTE: Performed by: ORTHOPAEDIC SURGERY

## 2024-12-31 PROCEDURE — A64721 PR REVISE MEDIAN N/CARPAL TUNNEL SURG: Performed by: ANESTHESIOLOGY

## 2024-12-31 PROCEDURE — 2720000007 HC OR 272 NO HCPCS: Performed by: ORTHOPAEDIC SURGERY

## 2024-12-31 RX ORDER — LIDOCAINE HCL/PF 100 MG/5ML
SYRINGE (ML) INTRAVENOUS AS NEEDED
Status: DISCONTINUED | OUTPATIENT
Start: 2024-12-31 | End: 2024-12-31

## 2024-12-31 RX ORDER — SODIUM CHLORIDE 9 MG/ML
50 INJECTION, SOLUTION INTRAVENOUS CONTINUOUS
Status: DISCONTINUED | OUTPATIENT
Start: 2024-12-31 | End: 2024-12-31 | Stop reason: HOSPADM

## 2024-12-31 RX ORDER — DEXAMETHASONE SODIUM PHOSPHATE 10 MG/ML
6 INJECTION INTRAMUSCULAR; INTRAVENOUS ONCE
Status: DISCONTINUED | OUTPATIENT
Start: 2024-12-31 | End: 2024-12-31 | Stop reason: HOSPADM

## 2024-12-31 RX ORDER — ALBUTEROL SULFATE 0.83 MG/ML
2.5 SOLUTION RESPIRATORY (INHALATION) ONCE AS NEEDED
Status: DISCONTINUED | OUTPATIENT
Start: 2024-12-31 | End: 2024-12-31 | Stop reason: HOSPADM

## 2024-12-31 RX ORDER — DIPHENHYDRAMINE HYDROCHLORIDE 50 MG/ML
25 INJECTION INTRAMUSCULAR; INTRAVENOUS ONCE AS NEEDED
Status: DISCONTINUED | OUTPATIENT
Start: 2024-12-31 | End: 2024-12-31 | Stop reason: HOSPADM

## 2024-12-31 RX ORDER — MEPERIDINE HYDROCHLORIDE 25 MG/ML
12.5 INJECTION INTRAMUSCULAR; INTRAVENOUS; SUBCUTANEOUS EVERY 10 MIN PRN
Status: DISCONTINUED | OUTPATIENT
Start: 2024-12-31 | End: 2024-12-31 | Stop reason: HOSPADM

## 2024-12-31 RX ORDER — KETOROLAC TROMETHAMINE 30 MG/ML
30 INJECTION, SOLUTION INTRAMUSCULAR; INTRAVENOUS ONCE AS NEEDED
Status: DISCONTINUED | OUTPATIENT
Start: 2024-12-31 | End: 2024-12-31 | Stop reason: HOSPADM

## 2024-12-31 RX ORDER — ONDANSETRON HYDROCHLORIDE 2 MG/ML
4 INJECTION, SOLUTION INTRAVENOUS ONCE AS NEEDED
Status: DISCONTINUED | OUTPATIENT
Start: 2024-12-31 | End: 2024-12-31 | Stop reason: HOSPADM

## 2024-12-31 RX ORDER — ACETAMINOPHEN 325 MG/1
650 TABLET ORAL EVERY 4 HOURS PRN
Status: DISCONTINUED | OUTPATIENT
Start: 2024-12-31 | End: 2024-12-31 | Stop reason: HOSPADM

## 2024-12-31 RX ORDER — BUPIVACAINE HYDROCHLORIDE 5 MG/ML
INJECTION, SOLUTION PERINEURAL AS NEEDED
Status: DISCONTINUED | OUTPATIENT
Start: 2024-12-31 | End: 2024-12-31 | Stop reason: HOSPADM

## 2024-12-31 RX ORDER — LIDOCAINE HYDROCHLORIDE 10 MG/ML
0.1 INJECTION, SOLUTION INFILTRATION; PERINEURAL ONCE
Status: DISCONTINUED | OUTPATIENT
Start: 2024-12-31 | End: 2024-12-31 | Stop reason: HOSPADM

## 2024-12-31 RX ORDER — BUPIVACAINE HYDROCHLORIDE 5 MG/ML
10 INJECTION, SOLUTION EPIDURAL; INTRACAUDAL ONCE
Status: DISCONTINUED | OUTPATIENT
Start: 2024-12-31 | End: 2024-12-31 | Stop reason: HOSPADM

## 2024-12-31 RX ORDER — IPRATROPIUM BROMIDE 0.5 MG/2.5ML
500 SOLUTION RESPIRATORY (INHALATION) ONCE
Status: DISCONTINUED | OUTPATIENT
Start: 2024-12-31 | End: 2024-12-31 | Stop reason: HOSPADM

## 2024-12-31 RX ORDER — PROPOFOL 10 MG/ML
INJECTION, EMULSION INTRAVENOUS AS NEEDED
Status: DISCONTINUED | OUTPATIENT
Start: 2024-12-31 | End: 2024-12-31

## 2024-12-31 RX ADMIN — PROPOFOL 20 MG: 10 INJECTION, EMULSION INTRAVENOUS at 07:28

## 2024-12-31 RX ADMIN — PROPOFOL 40 MG: 10 INJECTION, EMULSION INTRAVENOUS at 07:34

## 2024-12-31 RX ADMIN — PROPOFOL 50 MG: 10 INJECTION, EMULSION INTRAVENOUS at 07:27

## 2024-12-31 RX ADMIN — PROPOFOL 20 MG: 10 INJECTION, EMULSION INTRAVENOUS at 07:35

## 2024-12-31 RX ADMIN — LIDOCAINE HYDROCHLORIDE 60 MG: 20 INJECTION INTRAVENOUS at 07:27

## 2024-12-31 RX ADMIN — PROPOFOL 120 MCG/KG/MIN: 10 INJECTION, EMULSION INTRAVENOUS at 07:31

## 2024-12-31 RX ADMIN — PROPOFOL 20 MG: 10 INJECTION, EMULSION INTRAVENOUS at 07:30

## 2024-12-31 RX ADMIN — PROPOFOL 20 MG: 10 INJECTION, EMULSION INTRAVENOUS at 07:29

## 2024-12-31 SDOH — HEALTH STABILITY: MENTAL HEALTH: CURRENT SMOKER: 0

## 2024-12-31 ASSESSMENT — PAIN SCALES - GENERAL
PAINLEVEL_OUTOF10: 0 - NO PAIN
PAIN_LEVEL: 0
PAINLEVEL_OUTOF10: 0 - NO PAIN

## 2024-12-31 ASSESSMENT — PAIN - FUNCTIONAL ASSESSMENT
PAIN_FUNCTIONAL_ASSESSMENT: 0-10
PAIN_FUNCTIONAL_ASSESSMENT: 0-10
PAIN_FUNCTIONAL_ASSESSMENT: VAS (VISUAL ANALOG SCALE)
PAIN_FUNCTIONAL_ASSESSMENT: 0-10

## 2024-12-31 ASSESSMENT — COLUMBIA-SUICIDE SEVERITY RATING SCALE - C-SSRS
6. HAVE YOU EVER DONE ANYTHING, STARTED TO DO ANYTHING, OR PREPARED TO DO ANYTHING TO END YOUR LIFE?: NO
1. IN THE PAST MONTH, HAVE YOU WISHED YOU WERE DEAD OR WISHED YOU COULD GO TO SLEEP AND NOT WAKE UP?: NO
2. HAVE YOU ACTUALLY HAD ANY THOUGHTS OF KILLING YOURSELF?: NO

## 2024-12-31 NOTE — OP NOTE
RIGHT CARPAL TUNNEL RELEASE (R) Operative Note     Date: 2024  OR Location: [facility]    Name: Luther Ulloa, : 1965, Age: 59 y.o., MRN: [id number], Sex: female    Diagnosis  Pre-op Diagnosis      * Carpal tunnel syndrome on right [G56.01] Post-op Diagnosis     * Carpal tunnel syndrome on right [G56.01]     Procedures  RIGHT CARPAL TUNNEL RELEASE  65092 - [place] NEUROPLASTY &/TRANSPOS MEDIAN NRV CARPAL TUNNE      Surgeons      * Ovidio Hernandez - Primary    Resident/Fellow/Other Assistant:  Surgeons and Role:  * No surgeons found with a matching role *    Staff:   Circulator: Gin  Scrub Person: Rosy  Surgical Assistant: Victoriano    Anesthesia Staff: Anesthesiologist: Peter Fisher MD  CRNA: PRAKASH Valdes-CRNA    Procedure Summary  Anesthesia: Monitor Anesthesia Care  ASA: II  Estimated Blood Loss: 1 mL  Intra-op Medications:   Administrations occurring from 0730 to 0810 on 24:   Medication Name Total Dose   BUPivacaine HCl (Marcaine) 0.5 % (5 mg/mL) injection 10 mL   propofol (Diprivan) injection 10 mg/mL 161.2 mg              Anesthesia Record               Intraprocedure I/O Totals       None           Specimen: No specimens collected              Drains and/or Catheters: * None in log *    Tourniquet Times:   4 minutes at 250 mmHg on right forearm    Implants:     Findings: Thickened transverse carpal ligament    Indications: Luther Ulloa is an 59 y.o. female who is having surgery for Carpal tunnel syndrome on right [G56.01].  Patient had presented with right hand pain and numbness consistent with carpal tunnel syndrome.  She had electrodiagnostic tests which were confirmatory of carpal tunnel syndrome.  She had tried and failed conservative management.  We then discussed surgical treatment options occluding a right carpal tunnel release.  I discussed with her in detail the risk, benefits alternatives of a right carpal tunnel release.  I explained to the patient that the risks of the  procedure include but are not limited to infection, damage to nerves and blood vessels, continued numbness, postoperative pain and stiffness, as well as risks associate with anesthesia.  The patient voiced understanding and informed consent was obtained.    The patient was seen in the preoperative area. The risks, benefits, complications, treatment options, non-operative alternatives, expected recovery and outcomes were discussed with the patient. The possibilities of reaction to medication, pulmonary aspiration, injury to surrounding structures, bleeding, recurrent infection, the need for additional procedures, failure to diagnose a condition, and creating a complication requiring transfusion or operation were discussed with the patient. The patient concurred with the proposed plan, giving informed consent.  The site of surgery was properly noted/marked if necessary per policy. The patient has been actively warmed in preoperative area. Preoperative antibiotics are not indicated. Venous thrombosis prophylaxis are not indicated.    Procedure Details: The patient was prepped identified the preoperative waiting area and her right hand was marked as site of surgery.  Patient was taken back to the operating room suite placed supine on the OR table.  A nonsterile tourniquet was applied to the patient's right forearm.  After intravenous sedation was administered patient's right upper extremity was prepped and draped in usual sterile fashion.  A preoperative verification timeout was taken.  10 mL of half percent plain Marcaine was then injected as a local anesthetic.  Patient's right upper extremity was exsanguinated with an Esmarch bandage and the tourniquet inflated to 250 mmHg.  Approximately a 1.5 inch longitudinal incision was made in the base of the palm in line with the third webspace.  Sharp dissection was carried through skin and subcutaneous tissue.  Electrocautery was used to achieve hemostasis.  Identified the  palmar fascia and sharply split this.  I then dissected to the transverse carpal ligament.  This was identified and transected with a 15 blade.  Antebrachial fascia was released proximally.  Full decompression of the median nerve was confirmed.  Wound was irrigated with normal saline.  Skin was closed with 4-0 nylon suture.  Sterile bandage consisting of Xeroform, gauze 4 x 4's, Webril and Ace wrap was applied to the patient's right hand.  Patient was awakened from anesthesia and returned to recovery room stable condition.  There are no complications or any case.  All sponge and needle counts were correct at the end of the case.  Complications:  None; patient tolerated the procedure well.    Disposition: PACU - hemodynamically stable.  Condition: stable         Task Performed by RNFA or Surgical Assistant:  Surgical assistant helped with positioning, retraction, skin closure and application of bandage.          Additional Details: None    Attending Attestation: I performed the procedure.    Ovidio Hernandez  Phone Number: [phone]

## 2024-12-31 NOTE — ANESTHESIA POSTPROCEDURE EVALUATION
Patient: Luther Ulloa    Procedure Summary       Date: 12/31/24 Room / Location: [facility] OR 04 / [facility]    Anesthesia Start: 0724 Anesthesia Stop: 0748    Procedure: RIGHT CARPAL TUNNEL RELEASE (Right: Wrist) Diagnosis:       Carpal tunnel syndrome on right      (Carpal tunnel syndrome on right [G56.01])    Surgeons: Ovidio Hernandez MD Responsible Provider: Peter Fisher MD    Anesthesia Type: MAC ASA Status: 2            Anesthesia Type: MAC    Vitals Value Taken Time   /55 12/31/24 0748   Temp 36.4 12/31/24 0748   Pulse 67 12/31/24 0748   Resp 16 12/31/24 0748   SpO2 100 % 12/31/24 0746       Anesthesia Post Evaluation    Patient location during evaluation: PACU  Patient participation: complete - patient participated  Level of consciousness: awake and alert and sleepy but conscious  Pain score: 0  Pain management: satisfactory to patient  Airway patency: patent  Cardiovascular status: acceptable and hemodynamically stable  Respiratory status: acceptable, face mask, spontaneous ventilation and nonlabored ventilation  Hydration status: acceptable  Postoperative Nausea and Vomiting: none        There were no known notable events for this encounter.

## 2024-12-31 NOTE — BRIEF OP NOTE
Date: 2024  OR Location: [facility]    Name: Luther Ulloa, : 1965, Age: 59 y.o., MRN: [id number], Sex: female    Diagnosis  Pre-op Diagnosis      * Carpal tunnel syndrome on right [G56.01] Post-op Diagnosis     * Carpal tunnel syndrome on right [G56.01]     Procedures  RIGHT CARPAL TUNNEL RELEASE  36251 - [place] NEUROPLASTY &/TRANSPOS MEDIAN NRV CARPAL TUNNE      Surgeons      * Ovidio Hernandez - Primary    Resident/Fellow/Other Assistant:  Surgeons and Role:  * No surgeons found with a matching role *    Staff:   Circulator: Gin Ramirez Person: oRsy  Surgical Assistant: Victoriano    Anesthesia Staff: Anesthesiologist: Peter Fisher MD  CRNA: PRAKASH Valdes-CRNA    Procedure Summary  Anesthesia: Monitor Anesthesia Care  ASA: II  Estimated Blood Loss: 1 mL  Intra-op Medications:   Administrations occurring from 0730 to 0810 on 24:   Medication Name Total Dose   BUPivacaine HCl (Marcaine) 0.5 % (5 mg/mL) injection 10 mL   propofol (Diprivan) injection 10 mg/mL 161.2 mg              Anesthesia Record               Intraprocedure I/O Totals       None           Specimen: No specimens collected               Findings: Thickened transverse carpal ligament    Complications:  None; patient tolerated the procedure well.     Disposition: PACU - hemodynamically stable.  Condition: stable  Specimens Collected: No specimens collected  Attending Attestation: I performed the procedure.    Ovidio Hernandez  Phone Number: [phone]

## 2024-12-31 NOTE — ANESTHESIA PREPROCEDURE EVALUATION
Patient: Luther Ulloa    Procedure Information       Date/Time: 12/31/24 0730    Procedure: RIGHT CARPAL TUNNEL RELEASE (Right: Wrist)    Location: [facility] OR 04 / [facility]    Surgeons: Ovidio Hernandez MD            Relevant Problems   Cardiac   (+) Breast pain      Neuro   (+) Carpal tunnel syndrome of left wrist   (+) Carpal tunnel syndrome of right wrist   (+) Carpal tunnel syndrome on left   (+) Carpal tunnel syndrome on right      GI   (+) Rectal bleeding      Musculoskeletal   (+) Carpal tunnel syndrome of left wrist   (+) Carpal tunnel syndrome of right wrist   (+) Carpal tunnel syndrome on left   (+) Carpal tunnel syndrome on right      GYN   (+) Abnormal uterine bleeding (AUB)   (+) Menorrhagia   (+) Uterine fibroid       Clinical information reviewed:      Problems              NPO Detail:  No data recorded     Physical Exam    Airway  Mallampati: IV  TM distance: <3 FB  Neck ROM: full     Cardiovascular - normal exam  Rhythm: regular  Rate: normal     Dental - normal exam     Pulmonary - normal exam     Abdominal            Anesthesia Plan    History of general anesthesia?: yes  History of complications of general anesthesia?: no    ASA 2     MAC     The patient is not a current smoker.    intravenous induction   Postoperative administration of opioids is intended.  Anesthetic plan and risks discussed with patient.    Plan discussed with CRNA.

## 2025-01-13 ENCOUNTER — OFFICE VISIT (OUTPATIENT)
Dept: ORTHOPEDIC SURGERY | Facility: CLINIC | Age: 60
End: 2025-01-13
Payer: COMMERCIAL

## 2025-01-13 VITALS — HEIGHT: 62 IN | BODY MASS INDEX: 21.53 KG/M2 | WEIGHT: 117 LBS

## 2025-01-13 DIAGNOSIS — G56.01 CARPAL TUNNEL SYNDROME ON RIGHT: Primary | ICD-10-CM

## 2025-01-13 PROCEDURE — 3008F BODY MASS INDEX DOCD: CPT | Performed by: ORTHOPAEDIC SURGERY

## 2025-01-13 PROCEDURE — 1036F TOBACCO NON-USER: CPT | Performed by: ORTHOPAEDIC SURGERY

## 2025-01-13 PROCEDURE — 99211 OFF/OP EST MAY X REQ PHY/QHP: CPT | Performed by: ORTHOPAEDIC SURGERY

## 2025-01-13 NOTE — PROGRESS NOTES
Subjective    Patient ID: Luther Ulloa is a 59 y.o. female.    Chief Complaint: Post-op (POST OP CHECK RIGHT CARPAL TUNNEL RELEASE/DOS: 12/31/24/)     Last Surgery: Right Carpal Tunnel Release - Right  Last Surgery Date: 12/31/2024    HPI  The patient comes in for postoperative visit after going a right carpal tunnel release.  She has noticed improvement in her symptoms since surgery.  She has just mild pain.    Objective   Ortho Exam  The patient is in no acute distress.  Exam of her right hand reveals the incision is well-healed.  There is no evidence of infection.  Sutures are removed.  She has full range of motion in her fingers.    Assessment/Plan   Encounter Diagnoses:  Carpal tunnel syndrome on right    The patient is doing satisfactory following her right carpal tunnel release.  She was reassured at this take more than 2 weeks for the nerve to completely heal.  She may use her right hand is much as she can tolerate.  She will follow-up as her symptoms dictate.

## 2025-01-16 ENCOUNTER — APPOINTMENT (OUTPATIENT)
Dept: DERMATOLOGY | Facility: CLINIC | Age: 60
End: 2025-01-16
Payer: COMMERCIAL

## 2025-04-16 ENCOUNTER — APPOINTMENT (OUTPATIENT)
Dept: DERMATOLOGY | Facility: CLINIC | Age: 60
End: 2025-04-16
Payer: COMMERCIAL

## (undated) DEVICE — CUFF, TOURNIQUET, 18 X 4, DUAL PORT/SNGL BLADDER, DISP, LF

## (undated) DEVICE — Device

## (undated) DEVICE — PADDING, UNDERCAST, WYTEX, 3 IN X 4 YD, STERILE

## (undated) DEVICE — BANDAGE, ELASTIC, PREMIUM, SELF-CLOSE, 4 IN X 5.5 YD, STERILE

## (undated) DEVICE — DRESSING, GAUZE, PETROLATUM, PATCH, XEROFORM, 1 X 8 IN, STERILE

## (undated) DEVICE — DRAPE, SHEET, 17 X 23 IN